# Patient Record
Sex: MALE | Race: WHITE | NOT HISPANIC OR LATINO | Employment: FULL TIME | URBAN - NONMETROPOLITAN AREA
[De-identification: names, ages, dates, MRNs, and addresses within clinical notes are randomized per-mention and may not be internally consistent; named-entity substitution may affect disease eponyms.]

---

## 2017-01-13 ENCOUNTER — OFFICE VISIT (OUTPATIENT)
Dept: INTERNAL MEDICINE | Facility: CLINIC | Age: 32
End: 2017-01-13

## 2017-01-13 VITALS
SYSTOLIC BLOOD PRESSURE: 102 MMHG | HEIGHT: 73 IN | TEMPERATURE: 98.2 F | OXYGEN SATURATION: 98 % | HEART RATE: 86 BPM | DIASTOLIC BLOOD PRESSURE: 62 MMHG | WEIGHT: 202.19 LBS | BODY MASS INDEX: 26.8 KG/M2 | RESPIRATION RATE: 14 BRPM

## 2017-01-13 DIAGNOSIS — K21.9 GASTROESOPHAGEAL REFLUX DISEASE WITHOUT ESOPHAGITIS: ICD-10-CM

## 2017-01-13 DIAGNOSIS — I10 ESSENTIAL HYPERTENSION: ICD-10-CM

## 2017-01-13 DIAGNOSIS — B18.2 CHRONIC HEPATITIS C WITHOUT HEPATIC COMA (HCC): ICD-10-CM

## 2017-01-13 DIAGNOSIS — J06.9 ACUTE URI: ICD-10-CM

## 2017-01-13 DIAGNOSIS — R05.9 COUGH: ICD-10-CM

## 2017-01-13 DIAGNOSIS — N50.82 PAIN IN SCROTUM: ICD-10-CM

## 2017-01-13 DIAGNOSIS — G40.909 SEIZURE DISORDER (HCC): ICD-10-CM

## 2017-01-13 DIAGNOSIS — F41.8 DEPRESSION WITH ANXIETY: Primary | ICD-10-CM

## 2017-01-13 LAB
EXPIRATION DATE: NORMAL
FLUAV AG NPH QL: NORMAL
FLUBV AG NPH QL: NORMAL
INTERNAL CONTROL: NORMAL
Lab: NORMAL

## 2017-01-13 PROCEDURE — 87804 INFLUENZA ASSAY W/OPTIC: CPT | Performed by: PHYSICIAN ASSISTANT

## 2017-01-13 PROCEDURE — 99204 OFFICE O/P NEW MOD 45 MIN: CPT | Performed by: PHYSICIAN ASSISTANT

## 2017-01-13 RX ORDER — PAROXETINE 10 MG/1
10 TABLET, FILM COATED ORAL EVERY MORNING
Qty: 10 TABLET | Refills: 0 | Status: SHIPPED | OUTPATIENT
Start: 2017-01-13 | End: 2017-01-23

## 2017-01-13 RX ORDER — CARBAMAZEPINE 200 MG/1
200 TABLET ORAL 2 TIMES DAILY
COMMUNITY
End: 2017-01-13 | Stop reason: SDUPTHER

## 2017-01-13 RX ORDER — FLUOXETINE HYDROCHLORIDE 20 MG/1
20 CAPSULE ORAL DAILY
Qty: 30 CAPSULE | Refills: 2 | Status: SHIPPED | OUTPATIENT
Start: 2017-01-13 | End: 2017-08-22 | Stop reason: SDUPTHER

## 2017-01-13 RX ORDER — RANITIDINE 150 MG/1
150 TABLET ORAL NIGHTLY
COMMUNITY
End: 2017-01-13 | Stop reason: SDUPTHER

## 2017-01-13 RX ORDER — LORATADINE 10 MG/1
10 TABLET ORAL DAILY
Qty: 30 TABLET | Refills: 2 | Status: SHIPPED | OUTPATIENT
Start: 2017-01-13 | End: 2017-04-10 | Stop reason: SDUPTHER

## 2017-01-13 RX ORDER — HYDROXYZINE HYDROCHLORIDE 25 MG/1
TABLET, FILM COATED ORAL
Qty: 60 TABLET | Refills: 2 | Status: SHIPPED | OUTPATIENT
Start: 2017-01-13 | End: 2017-09-12 | Stop reason: SDUPTHER

## 2017-01-13 RX ORDER — LISINOPRIL 10 MG/1
10 TABLET ORAL DAILY
Qty: 30 TABLET | Refills: 2 | Status: SHIPPED | OUTPATIENT
Start: 2017-01-13 | End: 2017-09-12 | Stop reason: SDUPTHER

## 2017-01-13 RX ORDER — LISINOPRIL 10 MG/1
10 TABLET ORAL DAILY
COMMUNITY
End: 2017-01-13 | Stop reason: SDUPTHER

## 2017-01-13 RX ORDER — PAROXETINE 30 MG/1
30 TABLET, FILM COATED ORAL EVERY MORNING
COMMUNITY
End: 2017-01-13 | Stop reason: SDUPTHER

## 2017-01-13 RX ORDER — CARBAMAZEPINE 200 MG/1
100 TABLET ORAL 2 TIMES DAILY
Qty: 30 TABLET | Refills: 2 | Status: SHIPPED | OUTPATIENT
Start: 2017-01-13 | End: 2017-02-12

## 2017-01-13 RX ORDER — RANITIDINE 150 MG/1
150 TABLET ORAL 2 TIMES DAILY PRN
Qty: 60 TABLET | Refills: 2 | Status: SHIPPED | OUTPATIENT
Start: 2017-01-13 | End: 2017-09-12

## 2017-01-13 RX ORDER — OMEPRAZOLE 10 MG/1
10 CAPSULE, DELAYED RELEASE ORAL DAILY
COMMUNITY
End: 2017-01-13

## 2017-01-13 RX ORDER — LORATADINE 10 MG/1
10 TABLET ORAL DAILY
COMMUNITY
End: 2017-01-13 | Stop reason: SDUPTHER

## 2017-01-13 NOTE — MR AVS SNAPSHOT
Rafael Todd   1/13/2017 4:45 PM   Office Visit    Provider:  VERO Bee   Department:  [facility] PRIMARY CARE   Dept Phone:  [phone]                Your Full Care Plan              Today's Medication Changes          These changes are accurate as of: 1/13/17  5:25 PM.  If you have any questions, ask your nurse or doctor.               New Medication(s)Ordered:     FLUoxetine 20 MG capsule   Commonly known as:  PROZAC   Take 1 capsule by mouth Daily.       hydrOXYzine 25 MG tablet   Commonly known as:  ATARAX   Take 1-2 tablets at bedtime as needed for insomnia.         Medication(s)that have changed:     carBAMazepine 200 MG tablet   Commonly known as:  EPITOL   Take 0.5 tablets by mouth 2 (Two) Times a Day for 30 days.   What changed:  how much to take       PARoxetine 10 MG tablet   Commonly known as:  PAXIL   Take 1 tablet by mouth Every Morning for 10 days.   What changed:    - medication strength  - how much to take       raNITIdine 150 MG tablet   Commonly known as:  ZANTAC   Take 1 tablet by mouth 2 (Two) Times a Day As Needed for heartburn or indigestion.   What changed:    - when to take this  - reasons to take this         Stop taking medication(s)listed here:     omeprazole 10 MG capsule   Commonly known as:  prilOSEC                Where to Get Your Medications      These medications were sent to [facility] - [address] [place] [address] AT [facility] - [phone]  - [phone] [address] [address]     Phone:  [phone]     carBAMazepine 200 MG tablet    FLUoxetine 20 MG capsule    hydrOXYzine 25 MG tablet    lisinopril 10 MG tablet    loratadine 10 MG tablet    PARoxetine 10 MG tablet    raNITIdine 150 MG tablet                  Your Updated Medication List          This list is accurate as of: 1/13/17  5:25 PM.  Always use your most recent med list.                carBAMazepine 200 MG tablet    Commonly known as:  EPITOL   Take 0.5 tablets by mouth 2 (Two) Times a Day for 30 days.       FLUoxetine 20 MG capsule   Commonly known as:  PROZAC   Take 1 capsule by mouth Daily.       hydrOXYzine 25 MG tablet   Commonly known as:  ATARAX   Take 1-2 tablets at bedtime as needed for insomnia.       lisinopril 10 MG tablet   Commonly known as:  PRINIVIL,ZESTRIL   Take 1 tablet by mouth Daily.       loratadine 10 MG tablet   Commonly known as:  CLARITIN   Take 1 tablet by mouth Daily.       PARoxetine 10 MG tablet   Commonly known as:  PAXIL   Take 1 tablet by mouth Every Morning for 10 days.       raNITIdine 150 MG tablet   Commonly known as:  ZANTAC   Take 1 tablet by mouth 2 (Two) Times a Day As Needed for heartburn or indigestion.               We Performed the Following     Ambulatory Referral to Infectious Disease     US Scrotum & Testicles       You Were Diagnosed With        Codes Comments    Depression with anxiety    -  Primary ICD-10-CM: F41.8  ICD-9-CM: 300.4     Seizure disorder     ICD-10-CM: G40.909  ICD-9-CM: 345.90     Chronic hepatitis C without hepatic coma     ICD-10-CM: B18.2  ICD-9-CM: 070.54     Essential hypertension     ICD-10-CM: I10  ICD-9-CM: 401.9     Gastroesophageal reflux disease without esophagitis     ICD-10-CM: K21.9  ICD-9-CM: 530.81     Pain in scrotum     ICD-10-CM: N50.82  ICD-9-CM: 608.9       Instructions     None    Patient Instructions History      Health Elements Signup     [facility] allows you to send messages to your doctor, view your test results, renew your prescriptions, schedule appointments, and more. To sign up, go to [employer] and click on the Sign Up Now link in the New User? box. Enter your [employer] Activation Code exactly as it appears below along with the last four digits of your Social Security Number and your Date of Birth () to complete the sign-up process. If you do not sign up before the expiration date, you must request a new  "code.    [employer] Activation Code: [email or web address]  Expires: 1/27/2017  5:25 PM    If you have questions, you can email [email or web address] or call [phone] to talk to our [employer] staff. Remember, [employer] is NOT to be used for urgent needs. For medical emergencies, dial 911.               Other Info from Your Visit           Allergies     Gabapentin        Reason for Visit     Establish Care needs new pcp, med refills and referrals to GI for Hep. C, urology and neurology.       Vital Signs     Blood Pressure Pulse Temperature Respirations Height Weight    102/62 86 98.2 °F (36.8 °C) 14 73\" (185.4 cm) 202 lb 3 oz (91.7 kg)    Oxygen Saturation Body Mass Index                98% 26.68 kg/m2          Problems and Diagnoses Noted     Chronic hepatitis C without hepatic coma    Depression with anxiety    High blood pressure    Acid reflux disease    Seizure disorder    Pain in scrotum          "

## 2017-01-13 NOTE — PROGRESS NOTES
Subjective   Rafael Todd is a 31 y.o. male who presents to establish care.    Chief Complaint:  Diagnosed with Hepatitis C this past summer and needs referral to hepatology or gastroenterology for treatment.     Seizure activity around 2012 while he was drinking heavily. Was started on Tegretol but is unsure if he really needs it and requesting referral to neurology for consultation. No seizure activity in many years.     Pain in the scrotum with intercourse for the last 6 months and worsening. Denies any testicular pain or lumps. No history of hernia. Trouble maintaining erection due to pain.     Anxiety with panic attacks, depression and insomnia. Using Paxil which seemed to help for awhile but has stopped despite increase from 20 to 30 mg daily.  Tried Celexa, Zoloft and Effexor without improvement in the past.     GERD: was put on Zantac 150 mg at bedtime as well as Prilosec 10 mg each morning. Feels the Zantac helps him more and does not want to be on 2 different medications.     Essential Hypertension: started on Lisinopril about 5 months ago. BP tends to run higher at home around 140s systolic.     24 hours of low grade fever, body aches, chills, malaise, sore throat, hoarseness and cough.       The following portions of the patient's history were reviewed and updated as appropriate: allergies, current medications, past family history, past medical history, past social history, past surgical history and problem list.    Review of Systems  Review of Systems   Constitutional: Positive for chills, fatigue and fever. Negative for activity change, appetite change and unexpected weight change.        No malaise   HENT: Positive for rhinorrhea, sore throat and voice change. Negative for ear pain, hearing loss, nosebleeds and trouble swallowing.    Eyes: Negative for pain, discharge, redness, itching and visual disturbance.        No dry eyes   Respiratory: Positive for cough. Negative for chest tightness, shortness  "of breath and wheezing.         No SOB with exertion  No SOB lying down   Cardiovascular: Negative for chest pain, palpitations and leg swelling.        No leg cramps   Gastrointestinal: Negative for abdominal pain, blood in stool, constipation, diarrhea, nausea and vomiting.        No frequent heartburn  No change in bowel habits   Endocrine: Negative for cold intolerance, heat intolerance, polydipsia, polyphagia and polyuria.        No Muscle weakness  No feeling of weakness  No Hot flashes   Genitourinary: Positive for testicular pain. Negative for decreased urine volume, difficulty urinating, discharge, dysuria, frequency, hematuria, penile pain and urgency.        No lump on testicles   Musculoskeletal: Positive for myalgias. Negative for arthralgias, gait problem and joint swelling.        No limb pain  No limb swelling   Skin: Negative for rash and wound.        No rash  No lesions  No Itching  No change in mole   Neurological: Negative for dizziness, tremors, seizures, syncope, weakness, numbness and headaches.        No trouble walking  No confusion  No tingling       Hematological: Negative for adenopathy. Does not bruise/bleed easily.   Psychiatric/Behavioral: Positive for dysphoric mood and sleep disturbance. Negative for suicidal ideas. The patient is nervous/anxious.         No change in personality         Objective    Visit Vitals   • /62   • Pulse 86   • Temp 98.2 °F (36.8 °C)   • Resp 14   • Ht 73\" (185.4 cm)   • Wt 202 lb 3 oz (91.7 kg)   • SpO2 98%   • BMI 26.68 kg/m2     Physical Exam   Constitutional: He is oriented to person, place, and time. He appears well-developed and well-nourished. No distress.   HENT:   Head: Normocephalic and atraumatic.   OP erythema   Eyes: EOM are normal. Pupils are equal, round, and reactive to light.   Neck: Normal range of motion. Neck supple.   Cardiovascular: Normal rate, regular rhythm and normal heart sounds.  Exam reveals no gallop and no friction rub. "    No murmur heard.  Pulmonary/Chest: Effort normal and breath sounds normal. No respiratory distress. He exhibits no tenderness.   Musculoskeletal: Normal range of motion.   Neurological: He is alert and oriented to person, place, and time. No cranial nerve deficit. He exhibits normal muscle tone.   Skin: Skin is warm and dry. He is not diaphoretic.   Psychiatric: He has a normal mood and affect. His behavior is normal. Judgment and thought content normal.   Nursing note and vitals reviewed.        Assessment/Plan      Diagnosis Plan   1. Depression with anxiety  FLUoxetine (PROZAC) 20 MG capsule    PARoxetine (PAXIL) 10 MG tablet    hydrOXYzine (ATARAX) 25 MG tablet prn for insomnia or anxiety.    2. Seizure disorder  carBAMazepine (EPITOL) 200 MG tablet, decrease dose to 1/2 tab bid. If any seizure activity- increase back to original dose.   Referral to neurology.    3. Chronic hepatitis C without hepatic coma  Ambulatory Referral to Infectious Disease   4. Essential hypertension  lisinopril (PRINIVIL,ZESTRIL) 10 MG tablet   5. Gastroesophageal reflux disease without esophagitis  raNITIdine (ZANTAC) 150 MG tablet- increase to bid. D/c Omeprazole.    Acute URI- negative influenza A&B    DC Paxil- taper over the next 10 days prior to starting Prozac. F/u 1 month.

## 2017-01-19 ENCOUNTER — APPOINTMENT (OUTPATIENT)
Dept: ULTRASOUND IMAGING | Facility: HOSPITAL | Age: 32
End: 2017-01-19

## 2017-01-20 ENCOUNTER — HOSPITAL ENCOUNTER (OUTPATIENT)
Dept: ULTRASOUND IMAGING | Facility: HOSPITAL | Age: 32
Discharge: HOME | End: 2017-01-20
Admitting: PHYSICIAN ASSISTANT

## 2017-01-20 PROCEDURE — 76870 US EXAM SCROTUM: CPT

## 2017-04-10 RX ORDER — LORATADINE 10 MG/1
10 TABLET ORAL DAILY
Qty: 30 TABLET | Refills: 2 | Status: SHIPPED | OUTPATIENT
Start: 2017-04-10 | End: 2017-09-12

## 2017-08-20 ENCOUNTER — HOSPITAL ENCOUNTER (EMERGENCY)
Facility: HOSPITAL | Age: 32
Discharge: HOME | End: 2017-08-21
Attending: EMERGENCY MEDICINE | Admitting: EMERGENCY MEDICINE

## 2017-08-20 DIAGNOSIS — R07.89 ATYPICAL CHEST PAIN: Primary | ICD-10-CM

## 2017-08-20 PROCEDURE — 93005 ELECTROCARDIOGRAM TRACING: CPT | Performed by: EMERGENCY MEDICINE

## 2017-08-20 PROCEDURE — 99284 EMERGENCY DEPT VISIT MOD MDM: CPT

## 2017-08-20 RX ORDER — OMEPRAZOLE 20 MG/1
20 CAPSULE, DELAYED RELEASE ORAL DAILY
COMMUNITY
End: 2017-09-12

## 2017-08-20 RX ORDER — FLUOXETINE HYDROCHLORIDE 20 MG/1
40 CAPSULE ORAL ONCE
Status: COMPLETED | OUTPATIENT
Start: 2017-08-21 | End: 2017-08-21

## 2017-08-20 RX ORDER — CARBAMAZEPINE 100 MG/1
100 TABLET, EXTENDED RELEASE ORAL 2 TIMES DAILY
COMMUNITY
End: 2017-09-12

## 2017-08-21 VITALS
SYSTOLIC BLOOD PRESSURE: 107 MMHG | BODY MASS INDEX: 26.51 KG/M2 | HEIGHT: 73 IN | WEIGHT: 200 LBS | HEART RATE: 73 BPM | DIASTOLIC BLOOD PRESSURE: 74 MMHG | TEMPERATURE: 98.3 F | RESPIRATION RATE: 16 BRPM | OXYGEN SATURATION: 96 %

## 2017-08-21 LAB — TROPONIN I SERPL-MCNC: <0.012 NG/ML (ref 0–0.03)

## 2017-08-21 PROCEDURE — 84484 ASSAY OF TROPONIN QUANT: CPT | Performed by: EMERGENCY MEDICINE

## 2017-08-21 RX ADMIN — FLUOXETINE 40 MG: 20 CAPSULE ORAL at 00:08

## 2017-08-21 NOTE — ED PROVIDER NOTES
Subjective   History of Present Illness   32M w/ hx of depression / anxiety p/w numbness L face, arm, chest, SOB, tremors, mild central chest pain that started suddenly after turning TV off about 30 minutes pta. Ran out of prozac 2d ago and has missed last night and today's doses. Denies any history of prior DVT or PE, recent surgery or trauma to legs, hemoptysis, leg swelling or hormone use.  Denies other complaints.     Review of Systems   Cardiovascular: Positive for chest pain.   Neurological: Positive for numbness.   All other systems reviewed and are negative.      Past Medical History:   Diagnosis Date   • Acid reflux    • Anxiety    • Depression    • Hepatitis C    • Hypertension    • Insomnia    • Seizure disorder        Allergies   Allergen Reactions   • Gabapentin    • Keflex [Cephalexin] Nausea Only       History reviewed. No pertinent surgical history.    Family History   Problem Relation Age of Onset   • Arthritis Mother    • Mental illness Mother    • Arthritis Father    • Diabetes Father    • Hypertension Father    • Mental illness Father    • Cancer Neg Hx        Social History     Social History   • Marital status:      Spouse name: N/A   • Number of children: N/A   • Years of education: N/A     Social History Main Topics   • Smoking status: Current Every Day Smoker   • Smokeless tobacco: None   • Alcohol use No   • Drug use: None   • Sexual activity: Not Asked     Other Topics Concern   • None     Social History Narrative           Objective   Physical Exam   Constitutional: He is oriented to person, place, and time. He appears well-developed and well-nourished. No distress.   HENT:   Head: Normocephalic.   Mouth/Throat: Oropharynx is clear and moist.   Eyes: No scleral icterus.   Neck: Normal range of motion. Neck supple. No tracheal deviation present.   Cardiovascular: Normal rate, regular rhythm, normal heart sounds and intact distal pulses.  Exam reveals no gallop and no friction rub.     No murmur heard.  Pulmonary/Chest: Effort normal and breath sounds normal. No stridor. No respiratory distress. He has no wheezes. He has no rales. He exhibits no tenderness.   Abdominal: Soft. He exhibits no distension and no mass. There is no tenderness. There is no rebound and no guarding.   Musculoskeletal: Normal range of motion. He exhibits no deformity.   Neurological: He is alert and oriented to person, place, and time.   Skin: Skin is warm and dry. No rash noted. He is not diaphoretic. No erythema. No pallor.   Psychiatric: He has a normal mood and affect. His behavior is normal.   Nursing note and vitals reviewed.      Procedures         ED Course  ED Course                  MDM   32M here w/ atypical CP, numbness, tremors, SOB. AFVSS. Exam unremarkable. HEART =1 (for smoking). Nothing to suggest PE, dissection, tamponade, PNA, PNTX. Will get EKG, troponin and give dose of prozac here. More likely panic attack given hx of similar symptoms in past. If reassuring w/u, will d/c home. Pt is calling for refill of prozac tomorrow.     EKG: NSR w/ nl intervals, no jaymie/std. Overall, reassuring EKG    1:03 AM Troponin unremarkable. Discussed return to care precautions.     Final diagnoses:   Atypical chest pain            Pako Mann MD  08/21/17 0103

## 2017-08-22 DIAGNOSIS — F41.8 DEPRESSION WITH ANXIETY: ICD-10-CM

## 2017-08-22 RX ORDER — FLUOXETINE HYDROCHLORIDE 20 MG/1
20 CAPSULE ORAL DAILY
Qty: 30 CAPSULE | Refills: 0 | Status: SHIPPED | OUTPATIENT
Start: 2017-08-22 | End: 2017-09-12

## 2017-08-22 NOTE — TELEPHONE ENCOUNTER
Patient is completely out of Prozac, went to ER after not taking it at all Sunday, due to a panic attack and feeling funny. Does not want to go without it another day. Scheduled to see Emmie 8/25, can enough be called in to get him through to appt?

## 2017-08-24 ENCOUNTER — TELEPHONE (OUTPATIENT)
Dept: SURGERY | Facility: CLINIC | Age: 32
End: 2017-08-24

## 2017-08-24 ENCOUNTER — OFFICE VISIT (OUTPATIENT)
Dept: RETAIL CLINIC | Facility: CLINIC | Age: 32
End: 2017-08-24

## 2017-08-24 DIAGNOSIS — Z02.1 PRE-EMPLOYMENT DRUG SCREENING: Primary | ICD-10-CM

## 2017-08-24 NOTE — TELEPHONE ENCOUNTER
Patient no showed for appointment with Dr Cabrera unable to reach patient by phone, no show letter mailed to  Patient.Called PCP office spoke with Mehnaz  told her patient no showed for appointment . Mehnaz ask me to fax note saying patient no showed for appointment and so I did.

## 2017-09-12 ENCOUNTER — OFFICE VISIT (OUTPATIENT)
Dept: INTERNAL MEDICINE | Facility: CLINIC | Age: 32
End: 2017-09-12

## 2017-09-12 VITALS
TEMPERATURE: 98.2 F | WEIGHT: 189 LBS | DIASTOLIC BLOOD PRESSURE: 62 MMHG | OXYGEN SATURATION: 99 % | HEIGHT: 73 IN | HEART RATE: 93 BPM | SYSTOLIC BLOOD PRESSURE: 110 MMHG | BODY MASS INDEX: 25.05 KG/M2

## 2017-09-12 DIAGNOSIS — B18.2 CHRONIC HEPATITIS C WITHOUT HEPATIC COMA (HCC): Primary | ICD-10-CM

## 2017-09-12 DIAGNOSIS — G40.909 SEIZURE DISORDER (HCC): ICD-10-CM

## 2017-09-12 DIAGNOSIS — I10 ESSENTIAL HYPERTENSION: ICD-10-CM

## 2017-09-12 DIAGNOSIS — F41.8 DEPRESSION WITH ANXIETY: ICD-10-CM

## 2017-09-12 DIAGNOSIS — K21.9 GASTROESOPHAGEAL REFLUX DISEASE WITHOUT ESOPHAGITIS: ICD-10-CM

## 2017-09-12 DIAGNOSIS — J06.9 ACUTE URI: ICD-10-CM

## 2017-09-12 LAB
EXPIRATION DATE: NORMAL
FLUAV AG NPH QL: NEGATIVE
FLUBV AG NPH QL: NEGATIVE
INTERNAL CONTROL: NORMAL
Lab: NORMAL

## 2017-09-12 PROCEDURE — 87804 INFLUENZA ASSAY W/OPTIC: CPT | Performed by: PHYSICIAN ASSISTANT

## 2017-09-12 PROCEDURE — 99214 OFFICE O/P EST MOD 30 MIN: CPT | Performed by: PHYSICIAN ASSISTANT

## 2017-09-12 RX ORDER — CARBAMAZEPINE 200 MG/1
200 TABLET ORAL 2 TIMES DAILY
COMMUNITY
End: 2017-09-12 | Stop reason: SDUPTHER

## 2017-09-12 RX ORDER — OMEPRAZOLE 40 MG/1
40 CAPSULE, DELAYED RELEASE ORAL DAILY
COMMUNITY
End: 2017-09-12 | Stop reason: SDUPTHER

## 2017-09-12 RX ORDER — AZITHROMYCIN 250 MG/1
TABLET, FILM COATED ORAL
Qty: 6 TABLET | Refills: 0 | Status: SHIPPED | OUTPATIENT
Start: 2017-09-12 | End: 2017-10-23

## 2017-09-12 RX ORDER — OMEPRAZOLE 40 MG/1
40 CAPSULE, DELAYED RELEASE ORAL DAILY
Qty: 30 CAPSULE | Refills: 5 | Status: SHIPPED | OUTPATIENT
Start: 2017-09-12 | End: 2018-02-15 | Stop reason: SDUPTHER

## 2017-09-12 RX ORDER — BUPRENORPHINE HYDROCHLORIDE AND NALOXONE HYDROCHLORIDE 5.7; 1.4 MG/1; MG/1
TABLET, ORALLY DISINTEGRATING SUBLINGUAL
Refills: 0 | COMMUNITY
Start: 2017-08-30 | End: 2017-10-23 | Stop reason: SDDI

## 2017-09-12 RX ORDER — HYDROXYZINE HYDROCHLORIDE 25 MG/1
TABLET, FILM COATED ORAL
Qty: 60 TABLET | Refills: 5 | Status: SHIPPED | OUTPATIENT
Start: 2017-09-12 | End: 2018-03-12

## 2017-09-12 RX ORDER — ONDANSETRON 8 MG/1
8 TABLET, ORALLY DISINTEGRATING ORAL EVERY 8 HOURS PRN
Qty: 12 TABLET | Refills: 0 | Status: SHIPPED | OUTPATIENT
Start: 2017-09-12 | End: 2018-03-12

## 2017-09-12 RX ORDER — CETIRIZINE HYDROCHLORIDE 10 MG/1
10 TABLET ORAL NIGHTLY
Qty: 30 TABLET | Refills: 5 | Status: SHIPPED | OUTPATIENT
Start: 2017-09-12 | End: 2018-03-12

## 2017-09-12 RX ORDER — FLUOXETINE HYDROCHLORIDE 40 MG/1
40 CAPSULE ORAL DAILY
Qty: 30 CAPSULE | Refills: 5 | Status: SHIPPED | OUTPATIENT
Start: 2017-09-12 | End: 2018-02-15 | Stop reason: SDUPTHER

## 2017-09-12 RX ORDER — CARBAMAZEPINE 200 MG/1
200 TABLET ORAL 2 TIMES DAILY
Qty: 60 TABLET | Refills: 5 | Status: SHIPPED | OUTPATIENT
Start: 2017-09-12 | End: 2018-02-15 | Stop reason: SDUPTHER

## 2017-09-12 RX ORDER — LISINOPRIL 5 MG/1
5 TABLET ORAL DAILY
Qty: 30 TABLET | Refills: 5 | Status: SHIPPED | OUTPATIENT
Start: 2017-09-12 | End: 2018-02-15 | Stop reason: SDUPTHER

## 2017-09-12 NOTE — PROGRESS NOTES
Rafael Todd is a 32 y.o. male.     Subjective   History of Present Illness   Here today for follow up of ER visit on 8/20 when he was diagnosed with atypical chest pain.  Had been out of Prozac for 3 days and anxiety was very bad at the time. No recent chest pain since ER visit.     Prozac dose was increased to 40 mg by an outside provider and he feels this has helped his depression and anxiety considerably.     Has also had 3 days of cough, fatigue, head congestion and body aches along with diarrhea. Denies fever but has had chills. Symptoms steadily worsening since onset.     Would like to be tested for H. Pylori since his wife was recently treated for it. He reports chronic acid reflux and epigastric pain.         The following portions of the patient's history were reviewed and updated as appropriate: allergies, current medications, past family history, past medical history, past social history, past surgical history and problem list.    Review of Systems    Constitutional: fatigue, chills. Negative for appetite change, chills, fever and unexpected weight change.   HENT: congestion, postnasal drip, rhinorrhea, sore throat.  Negative for ear pain, hearing loss, nosebleeds, tinnitus and trouble swallowing.    Eyes: Negative for photophobia, discharge and visual disturbance.   Respiratory: cough. Negative for chest tightness, shortness of breath and wheezing.    Cardiovascular: chest pain. Negative for palpitations and leg swelling.   Gastrointestinal: acid reflux, epigastric pain. Negative for abdominal distention, blood in stool, constipation, diarrhea, nausea and vomiting.   Endocrine: Negative for cold intolerance, heat intolerance, polydipsia, polyphagia and polyuria.   Musculoskeletal: body aches. Negative for back pain, joint swelling, myalgias, neck pain and neck stiffness.   Skin: Negative for color change, pallor, rash and wound.   Allergic/Immunologic: Negative for environmental allergies, food  "allergies and immunocompromised state.   Neurological: Negative for dizziness, tremors, seizures, weakness, numbness and headaches.   Hematological: Negative for adenopathy. Does not bruise/bleed easily.   Psychiatric/Behavioral: anxious, dysphoric mood. Negative for sleep disturbances, agitation, behavioral problems, confusion, hallucinations, self-injury and suicidal ideas.     Objective    Physical Exam  Constitutional: Oriented to person, place, and time. Appears well-developed and well-nourished.   HENT: OP erythema, visible PND.   Head: Normocephalic and atraumatic.   Eyes: EOM are normal. Pupils are equal, round, and reactive to light.   Neck: Normal range of motion. Neck supple.   Cardiovascular: Normal rate, regular rhythm and normal heart sounds.    Pulmonary/Chest: Effort normal and breath sounds normal. No respiratory distress.  Has no wheezes or rales. Exhibits no chest wall tenderness.   Abdominal: Soft. Bowel sounds are normal. Exhibits no distension and no mass. There is no tenderness.   Musculoskeletal: Normal range of motion. Exhibits no tenderness.   Neurological: Alert and oriented to person, place, and time.   Skin: Skin is warm and dry.   Psychiatric: Has a normal mood and affect. Behavior is normal. Judgment and thought content normal.         /62  Pulse 93  Temp 98.2 °F (36.8 °C)  Ht 73\" (185.4 cm)  Wt 189 lb (85.7 kg)  SpO2 99%  BMI 24.94 kg/m2    Nursing note and vitals reviewed.        Assessment/Plan   Rafael was seen today for follow-up.    Diagnoses and all orders for this visit:    Chronic hepatitis C without hepatic coma  -     Ambulatory Referral to Gastroenterology  -     Comprehensive Metabolic Panel    Essential hypertension  -     lisinopril (PRINIVIL,ZESTRIL) 5 MG tablet; Take 1 tablet by mouth Daily.  -     Comprehensive Metabolic Panel    Depression with anxiety  -     hydrOXYzine (ATARAX) 25 MG tablet; Take 1-2 tablets at bedtime as needed for insomnia.  -     " FLUoxetine (PROzac) 40 MG capsule; Take 1 capsule by mouth Daily.    Gastroesophageal reflux disease without esophagitis  -     omeprazole (priLOSEC) 40 MG capsule; Take 1 capsule by mouth Daily.  -     Helicobacter Pylori, IgA IgG IgM    Seizure disorder  -     carBAMazepine (TEGretol) 200 MG tablet; Take 1 tablet by mouth 2 (Two) Times a Day.  -     Ambulatory Referral to Neurology  -     Comprehensive Metabolic Panel    Acute URI  -     azithromycin (ZITHROMAX Z-AYSE) 250 MG tablet; Take 2 tablets the first day, then 1 tablet daily for 4 days.    Other orders  -     cetirizine (zyrTEC) 10 MG tablet; Take 1 tablet by mouth Every Night.    ER record reviewed.     Flu negative.

## 2017-09-13 LAB
ALBUMIN SERPL-MCNC: 4.8 G/DL (ref 3.5–5)
ALBUMIN/GLOB SERPL: 1.4 G/DL (ref 1–2)
ALP SERPL-CCNC: 78 U/L (ref 38–126)
ALT SERPL-CCNC: 62 U/L (ref 13–69)
AST SERPL-CCNC: 41 U/L (ref 15–46)
BILIRUB SERPL-MCNC: 0.6 MG/DL (ref 0.2–1.3)
BUN SERPL-MCNC: 10 MG/DL (ref 7–20)
BUN/CREAT SERPL: 10 (ref 6.3–21.9)
CALCIUM SERPL-MCNC: 10.2 MG/DL (ref 8.4–10.2)
CHLORIDE SERPL-SCNC: 104 MMOL/L (ref 98–107)
CO2 SERPL-SCNC: 26 MMOL/L (ref 26–30)
CREAT SERPL-MCNC: 1 MG/DL (ref 0.6–1.3)
GLOBULIN SER CALC-MCNC: 3.5 GM/DL
GLUCOSE SERPL-MCNC: 79 MG/DL (ref 74–98)
H PYLORI IGA SER-ACNC: <9 UNITS (ref 0–8.9)
H PYLORI IGG SER IA-ACNC: <0.9 U/ML (ref 0–0.8)
H PYLORI IGM SER-ACNC: <9 UNITS (ref 0–8.9)
POTASSIUM SERPL-SCNC: 5.1 MMOL/L (ref 3.5–5.1)
PROT SERPL-MCNC: 8.3 G/DL (ref 6.3–8.2)
SODIUM SERPL-SCNC: 143 MMOL/L (ref 137–145)

## 2017-09-14 DIAGNOSIS — K21.9 GASTROESOPHAGEAL REFLUX DISEASE, ESOPHAGITIS PRESENCE NOT SPECIFIED: ICD-10-CM

## 2017-09-14 DIAGNOSIS — R10.13 EPIGASTRIC PAIN: Primary | ICD-10-CM

## 2017-10-23 ENCOUNTER — PREP FOR SURGERY (OUTPATIENT)
Dept: OTHER | Facility: HOSPITAL | Age: 32
End: 2017-10-23

## 2017-10-23 ENCOUNTER — PROCEDURE VISIT (OUTPATIENT)
Dept: GASTROENTEROLOGY | Facility: CLINIC | Age: 32
End: 2017-10-23

## 2017-10-23 VITALS
RESPIRATION RATE: 16 BRPM | HEART RATE: 86 BPM | TEMPERATURE: 96 F | HEIGHT: 73 IN | BODY MASS INDEX: 27.17 KG/M2 | DIASTOLIC BLOOD PRESSURE: 90 MMHG | SYSTOLIC BLOOD PRESSURE: 138 MMHG | WEIGHT: 205 LBS

## 2017-10-23 DIAGNOSIS — R12 HEARTBURN: Chronic | ICD-10-CM

## 2017-10-23 DIAGNOSIS — R10.13 EPIGASTRIC PAIN: Primary | Chronic | ICD-10-CM

## 2017-10-23 DIAGNOSIS — R11.0 NAUSEA: Chronic | ICD-10-CM

## 2017-10-23 DIAGNOSIS — B19.20 HEPATITIS C VIRUS INFECTION WITHOUT HEPATIC COMA, UNSPECIFIED CHRONICITY: ICD-10-CM

## 2017-10-23 DIAGNOSIS — R12 HEARTBURN: ICD-10-CM

## 2017-10-23 DIAGNOSIS — R11.0 NAUSEA: ICD-10-CM

## 2017-10-23 DIAGNOSIS — R10.13 EPIGASTRIC PAIN: Primary | ICD-10-CM

## 2017-10-23 PROCEDURE — 99214 OFFICE O/P EST MOD 30 MIN: CPT | Performed by: NURSE PRACTITIONER

## 2017-10-23 RX ORDER — SODIUM CHLORIDE 9 MG/ML
70 INJECTION, SOLUTION INTRAVENOUS CONTINUOUS PRN
Status: CANCELLED | OUTPATIENT
Start: 2017-10-23

## 2017-10-23 NOTE — PROGRESS NOTES
"Chief Complaint   Patient presents with   • Abdominal Pain   • Nausea   • Heartburn   • Liver Eval     The patient has a history of epigastric pain for the past 6 months. The patient has the pain daily usually. The pain is described as severe. Eating does not affect the pain. The patient has been taking Omeprazole without improvement of the pain. The patient has a long-standing history of hiccups. He may have hiccups for up to 9 days without stopping. He has wanted to go to the ER in the past as it did not give him a \"chance to breathe\" between the hiccups. Of interest, the patient drinks 4-5 pots of coffee daily.    There is a long-standing history of heartburn. The patient may have heartburn intermittently. Heartburn is described as mild. It does not wake him at night, unless he has the hiccups and this can cause regurgitative symptoms due to hiccups.     The patient has a history of nausea since April 2017. The patient has nausea when he has been having the hiccups. Eating does not affect the nausea. The patient has taken Zofran with good improvement.    The patient was diagnosed with hepatitis C in 2016 by a prior PCP in [place], [place]. No history of work up or treatment in the past. There is a history of IVDA  From teenage years until October 2016. There is a history of tattoos. No history of blood transfusions. There is a history of alcohol use. The patient started drinking alcohol as a teenager. He decreased alcohol intake in December 2016. He had 2 beers last night, 10/22/2017, and this was his first alcohol since December 2016. The patient has a history of depression and is currently being treated. The patient does not feel like it is helping at this time. No history of suicidal thoughts or suicidal attempts in the past. The patient is , the wife has 2 children, but they do not live with them. The patient is not working at this time, he is unemployed.    There is no history of constipation or diarrhea. " There is no history of bright red blood per rectum. The patient has not had a colonoscopy in the past. There is no family history of colon cancer.    Abdominal Pain   This is a chronic problem. Episode onset: April 2017. The onset quality is sudden. The problem occurs daily. The problem has been unchanged. The pain is located in the epigastric region. The pain is severe. The quality of the pain is sharp. The abdominal pain does not radiate. Associated symptoms include nausea. Pertinent negatives include no arthralgias, constipation, diarrhea, dysuria, fever, headaches, hematochezia, hematuria, melena, myalgias or vomiting. Nothing aggravates the pain. The pain is relieved by nothing. He has tried proton pump inhibitors for the symptoms. The treatment provided no relief. His past medical history is significant for GERD.   Nausea   This is a chronic problem. Episode onset: April 2017. The problem occurs intermittently. The problem has been unchanged. Associated symptoms include abdominal pain, chest pain, congestion, coughing, fatigue and nausea. Pertinent negatives include no arthralgias, chills, fever, headaches, joint swelling, myalgias, rash or vomiting. Nothing aggravates the symptoms. Treatments tried: Zofran. The treatment provided significant relief.   Heartburn   He complains of abdominal pain, chest pain, coughing, heartburn and nausea. He reports no choking or no dysphagia. This is a chronic problem. Episode onset: over 10 years. The problem occurs occasionally. The problem has been unchanged. The heartburn duration is an hour. The heartburn is of mild intensity. Nothing aggravates the symptoms. Associated symptoms include fatigue. Pertinent negatives include no melena. Risk factors include caffeine use and smoking/tobacco exposure (The patient drinks 5-6 pots of coffee daily). He has tried a PPI for the symptoms. The treatment provided significant relief.      Review of Systems   Constitutional: Positive  for appetite change and fatigue. Negative for chills, fever and unexpected weight change.   HENT: Positive for congestion. Negative for mouth sores, nosebleeds and trouble swallowing.    Eyes: Negative for discharge and redness.   Respiratory: Positive for cough. Negative for apnea, choking and shortness of breath.    Cardiovascular: Positive for chest pain. Negative for palpitations and leg swelling.   Gastrointestinal: Positive for abdominal pain, heartburn and nausea. Negative for abdominal distention, anal bleeding, blood in stool, constipation, diarrhea, dysphagia, hematochezia, melena and vomiting.   Endocrine: Negative for cold intolerance, heat intolerance and polydipsia.   Genitourinary: Negative for dysuria, hematuria and urgency.   Musculoskeletal: Negative for arthralgias, joint swelling and myalgias.   Skin: Negative for rash.   Allergic/Immunologic: Negative for food allergies and immunocompromised state.   Neurological: Negative for dizziness, seizures, syncope and headaches.   Hematological: Negative for adenopathy. Does not bruise/bleed easily.   Psychiatric/Behavioral: Negative for dysphoric mood. The patient is not nervous/anxious and is not hyperactive.      Patient Active Problem List   Diagnosis   • Chronic hepatitis C without hepatic coma   • Depression with anxiety   • Seizure disorder   • Essential hypertension   • Gastroesophageal reflux disease without esophagitis   • Epigastric pain   • Heartburn   • Nausea   • Hepatitis C virus infection without hepatic coma     Past Medical History:   Diagnosis Date   • Acid reflux    • Anxiety    • Depression    • Hepatitis C    • Hypertension    • Insomnia    • Seizure disorder      History reviewed. No pertinent surgical history.     Family History   Problem Relation Age of Onset   • Arthritis Mother    • Mental illness Mother    • Inflammatory bowel disease Mother    • Arthritis Father    • Diabetes Father    • Hypertension Father    • Mental  "illness Father    • Cancer Neg Hx    • Colon cancer Neg Hx      Social History   Substance Use Topics   • Smoking status: Current Every Day Smoker     Packs/day: 1.00     Types: Cigarettes   • Smokeless tobacco: Never Used   • Alcohol use Yes      Comment: social       Current Outpatient Prescriptions:   •  carBAMazepine (TEGretol) 200 MG tablet, Take 1 tablet by mouth 2 (Two) Times a Day., Disp: 60 tablet, Rfl: 5  •  cetirizine (zyrTEC) 10 MG tablet, Take 1 tablet by mouth Every Night., Disp: 30 tablet, Rfl: 5  •  FLUoxetine (PROzac) 40 MG capsule, Take 1 capsule by mouth Daily., Disp: 30 capsule, Rfl: 5  •  hydrOXYzine (ATARAX) 25 MG tablet, Take 1-2 tablets at bedtime as needed for insomnia., Disp: 60 tablet, Rfl: 5  •  lisinopril (PRINIVIL,ZESTRIL) 5 MG tablet, Take 1 tablet by mouth Daily., Disp: 30 tablet, Rfl: 5  •  omeprazole (priLOSEC) 40 MG capsule, Take 1 capsule by mouth Daily., Disp: 30 capsule, Rfl: 5  •  ondansetron ODT (ZOFRAN ODT) 8 MG disintegrating tablet, Take 1 tablet by mouth Every 8 (Eight) Hours As Needed for Nausea or Vomiting., Disp: 12 tablet, Rfl: 0    Allergies   Allergen Reactions   • Gabapentin    • Keflex [Cephalexin] Nausea Only     /90  Pulse 86  Temp 96 °F (35.6 °C)  Resp 16  Ht 73\" (185.4 cm)  Wt 205 lb (93 kg)  BMI 27.05 kg/m2    Physical Exam   Constitutional: He is oriented to person, place, and time. He appears well-developed and well-nourished. No distress.   HENT:   Head: Normocephalic and atraumatic.   Right Ear: Hearing and external ear normal.   Left Ear: Hearing and external ear normal.   Nose: Nose normal.   Mouth/Throat: Oropharynx is clear and moist and mucous membranes are normal. Mucous membranes are not pale, not dry and not cyanotic. No oral lesions. No oropharyngeal exudate.   Eyes: Conjunctivae and EOM are normal. Right eye exhibits no discharge. Left eye exhibits no discharge.   Neck: Trachea normal. Neck supple. No JVD present. No edema present. No " thyroid mass and no thyromegaly present.   Cardiovascular: Normal rate, regular rhythm, S2 normal and normal heart sounds.  Exam reveals no gallop, no S3 and no friction rub.    No murmur heard.  Pulmonary/Chest: Effort normal and breath sounds normal. No respiratory distress. He exhibits no tenderness.   Abdominal: Normal appearance and bowel sounds are normal. He exhibits no distension, no ascites and no mass. There is no splenomegaly or hepatomegaly. There is no tenderness. There is no rigidity, no rebound and no guarding. No hernia.       Vascular Status -  His exam exhibits no right foot edema. His exam exhibits no left foot edema.  Lymphadenopathy:     He has no cervical adenopathy.        Left: No supraclavicular adenopathy present.   Neurological: He is alert and oriented to person, place, and time. He has normal strength. No cranial nerve deficit or sensory deficit.   Skin: No rash noted. He is not diaphoretic. No cyanosis. No pallor. Nails show no clubbing.   Psychiatric: He has a normal mood and affect.   Nursing note and vitals reviewed.  Stigmata of chronic liver disease:  None.  Asterixis:  None.    Laboratory Results:  Upon review of records:    Dated 9/12/2017 glucose 79 BUN 10 creatinine 1.0 sodium 143 potassium 5.1 chloride 104 CO2 26 calcium 10.2 albumin 4.8 total bilirubin 0.6 alkaline phosphatase 78 AST 41 ALT 62 H. pylori IgG: <0.9, H. pylori IgA: <9.0, H. pylori IgM: <9.0    Notes:  1. Diagnosed with hepatitis C in 2016 by a prior PCP in [place], [place]. No history of work up or treatment in the past.  2. There is a history of IVDA  From teenage years until October 2016.  3. There is a history of tattoos.  4. No history of blood transfusions.  5. There is a history of alcohol use. The patient started drinking alcohol as a teenager. He decreased alcohol intake in December 2016. He had 2 beers last night, 10/22/2017, and this was his first alcohol since December 2016.   6. The patient has a history  of depression and is currently being treated. The patient does not feel like it is helping at this time. No history of suicidal thoughts or suicidal attempts in the past.  7. The patient is , the wife has 2 children, but they do not live with them. The patient is not working at this time, he is unemployed.    Assessment and Plan:    Rafael was seen today for abdominal pain, nausea, heartburn and liver eval.    Diagnoses and all orders for this visit:    Epigastric pain  Comments:  Differentials include peptic ulcer disease, pancreatobiliary disease.  Orders:  -     CBC & Differential; Future  -     Comprehensive Metabolic Panel; Future  -     TSH; Future    Heartburn  Comments:  History of long-standing heartburn.  Controlled with omeprazole daily.  Concerns for underlying Ellis's.    Nausea  Comments:  Differentials include peptic ulcer disease, pancreatobiliary disease.  Orders:  -     CBC & Differential; Future  -     Comprehensive Metabolic Panel; Future  -     TSH; Future    Hepatitis C virus infection without hepatic coma, unspecified chronicity  Comments:  History of hepatitis C per patient.  Unfortunately, we do not have any labs regarding hepatitis C.  Orders:  -     CBC & Differential; Future  -     Comprehensive Metabolic Panel; Future  -     Hepatitis A Antibody, IgM; Future  -     Hepatitis A Antibody, Total; Future  -     Hepatitis B Core Antibody, IgM; Future  -     Hepatitis B Surface Antibody; Future  -     Hepatitis B Surface Antigen; Future  -     Hepatitis B Core Antibody, Total; Future  -     Hepatitis C RNA, Quantitative, PCR (graph); Future  -     Hepatitis C Genotype; Future        Plan  and Patient Instructions:  Patient Instructions   1. Antireflux measures: Avoid fried, fatty foods, alcohol, chocolate, coffee, tea,  soft drinks, peppermint and spearmint, spicy foods, tomatoes and tomato based foods, onion based foods, and smoking. Other antireflux measures include weight reduction  if overweight, avoiding tight clothing around the abdomen, elevating the head of the bed 6 inches with blocks under the head board, and don't drink or eat before going to bed and avoid lying down immediately after meals. Advised to decrease coffee intake.  2. Omeprazole 40 mg 1 po daily in the am 30 minutes before breakfast.  3. Zofran 4 mg 1 po every 8 hours as needed for nausea.  4. Avoid alcohol.  5. Avoid drugs.  6. Tylenol warning.  7. Hepatitis C counseling.  8. Check CBC, CMP, Hepatitis panel, TSH, HCV RNA by PCR with reflex to genotype.  9. Possible non-invasive liver work up in the future.  10. Possible abdominal ultrasound in the future.  11. Upper endoscopy-EGD: Description of the procedure, risks, benefits, alternatives and options, including nonoperative options, were discussed with the patient in detail. The patient understands and wishes to proceed.    The patient was seen along with his wife.    PANFILO Reyes

## 2017-10-23 NOTE — PATIENT INSTRUCTIONS
1. Antireflux measures: Avoid fried, fatty foods, alcohol, chocolate, coffee, tea,  soft drinks, peppermint and spearmint, spicy foods, tomatoes and tomato based foods, onion based foods, and smoking. Other antireflux measures include weight reduction if overweight, avoiding tight clothing around the abdomen, elevating the head of the bed 6 inches with blocks under the head board, and don't drink or eat before going to bed and avoid lying down immediately after meals. Advised to decrease coffee intake.  2. Omeprazole 40 mg 1 po daily in the am 30 minutes before breakfast.  3. Zofran 4 mg 1 po every 8 hours as needed for nausea.  4. Avoid alcohol.  5. Avoid drugs.  6. Tylenol warning.  7. Hepatitis C counseling.  8. Check CBC, CMP, Hepatitis panel, TSH, HCV RNA by PCR with reflex to genotype.  9. Possible non-invasive liver work up in the future.  10. Possible abdominal ultrasound in the future.  11. Upper endoscopy-EGD: Description of the procedure, risks, benefits, alternatives and options, including nonoperative options, were discussed with the patient in detail. The patient understands and wishes to proceed.    The patient was seen along with his wife.

## 2017-10-24 ENCOUNTER — APPOINTMENT (OUTPATIENT)
Dept: GENERAL RADIOLOGY | Facility: HOSPITAL | Age: 32
End: 2017-10-24

## 2017-10-24 ENCOUNTER — APPOINTMENT (OUTPATIENT)
Dept: CT IMAGING | Facility: HOSPITAL | Age: 32
End: 2017-10-24

## 2017-10-24 ENCOUNTER — HOSPITAL ENCOUNTER (EMERGENCY)
Facility: HOSPITAL | Age: 32
Discharge: HOME | End: 2017-10-24
Attending: EMERGENCY MEDICINE | Admitting: EMERGENCY MEDICINE

## 2017-10-24 VITALS
WEIGHT: 205 LBS | RESPIRATION RATE: 20 BRPM | DIASTOLIC BLOOD PRESSURE: 97 MMHG | HEART RATE: 81 BPM | HEIGHT: 72 IN | BODY MASS INDEX: 27.77 KG/M2 | TEMPERATURE: 98.6 F | SYSTOLIC BLOOD PRESSURE: 121 MMHG | OXYGEN SATURATION: 97 %

## 2017-10-24 DIAGNOSIS — R06.6 HICCUPS: Primary | ICD-10-CM

## 2017-10-24 LAB
ALBUMIN SERPL-MCNC: 4.4 G/DL (ref 3.5–5)
ALBUMIN/GLOB SERPL: 1.3 G/DL (ref 1–2)
ALP SERPL-CCNC: 79 U/L (ref 38–126)
ALT SERPL W P-5'-P-CCNC: 70 U/L (ref 13–69)
ANION GAP SERPL CALCULATED.3IONS-SCNC: 15.6 MMOL/L
AST SERPL-CCNC: 59 U/L (ref 15–46)
BASOPHILS # BLD AUTO: 0.04 10*3/MM3 (ref 0–0.2)
BASOPHILS NFR BLD AUTO: 0.5 % (ref 0–2.5)
BILIRUB SERPL-MCNC: 0.5 MG/DL (ref 0.2–1.3)
BUN BLD-MCNC: 16 MG/DL (ref 7–20)
BUN/CREAT SERPL: 20 (ref 6.3–21.9)
CALCIUM SPEC-SCNC: 9.3 MG/DL (ref 8.4–10.2)
CHLORIDE SERPL-SCNC: 101 MMOL/L (ref 98–107)
CO2 SERPL-SCNC: 25 MMOL/L (ref 26–30)
CREAT BLD-MCNC: 0.8 MG/DL (ref 0.6–1.3)
CRP SERPL-MCNC: 3.7 MG/DL (ref 0–1)
DEPRECATED RDW RBC AUTO: 39.8 FL (ref 37–54)
EOSINOPHIL # BLD AUTO: 0.01 10*3/MM3 (ref 0–0.7)
EOSINOPHIL NFR BLD AUTO: 0.1 % (ref 0–7)
ERYTHROCYTE [DISTWIDTH] IN BLOOD BY AUTOMATED COUNT: 12.1 % (ref 11.5–14.5)
GFR SERPL CREATININE-BSD FRML MDRD: 111 ML/MIN/1.73
GLOBULIN UR ELPH-MCNC: 3.5 GM/DL
GLUCOSE BLD-MCNC: 104 MG/DL (ref 74–98)
HCT VFR BLD AUTO: 36.8 % (ref 42–52)
HGB BLD-MCNC: 12.9 G/DL (ref 14–18)
IMM GRANULOCYTES # BLD: 0.04 10*3/MM3 (ref 0–0.06)
IMM GRANULOCYTES NFR BLD: 0.5 % (ref 0–0.6)
LYMPHOCYTES # BLD AUTO: 1.91 10*3/MM3 (ref 0.6–3.4)
LYMPHOCYTES NFR BLD AUTO: 22 % (ref 10–50)
MCH RBC QN AUTO: 31.2 PG (ref 27–31)
MCHC RBC AUTO-ENTMCNC: 35.1 G/DL (ref 30–37)
MCV RBC AUTO: 88.9 FL (ref 80–94)
MONOCYTES # BLD AUTO: 1.29 10*3/MM3 (ref 0–0.9)
MONOCYTES NFR BLD AUTO: 14.8 % (ref 0–12)
NEUTROPHILS # BLD AUTO: 5.4 10*3/MM3 (ref 2–6.9)
NEUTROPHILS NFR BLD AUTO: 62.1 % (ref 37–80)
NRBC BLD MANUAL-RTO: 0 /100 WBC (ref 0–0)
PLATELET # BLD AUTO: 131 10*3/MM3 (ref 130–400)
PMV BLD AUTO: 11.4 FL (ref 6–12)
POTASSIUM BLD-SCNC: 4.6 MMOL/L (ref 3.5–5.1)
PROT SERPL-MCNC: 7.9 G/DL (ref 6.3–8.2)
RBC # BLD AUTO: 4.14 10*6/MM3 (ref 4.7–6.1)
SODIUM BLD-SCNC: 137 MMOL/L (ref 137–145)
WBC NRBC COR # BLD: 8.69 10*3/MM3 (ref 4.8–10.8)

## 2017-10-24 PROCEDURE — 85025 COMPLETE CBC W/AUTO DIFF WBC: CPT | Performed by: NURSE PRACTITIONER

## 2017-10-24 PROCEDURE — 0 IOPAMIDOL 61 % SOLUTION: Performed by: EMERGENCY MEDICINE

## 2017-10-24 PROCEDURE — 25010000002 CHLORPROMAZINE PER 50 MG: Performed by: NURSE PRACTITIONER

## 2017-10-24 PROCEDURE — 71020 HC CHEST PA AND LATERAL: CPT

## 2017-10-24 PROCEDURE — 86140 C-REACTIVE PROTEIN: CPT | Performed by: NURSE PRACTITIONER

## 2017-10-24 PROCEDURE — 96372 THER/PROPH/DIAG INJ SC/IM: CPT

## 2017-10-24 PROCEDURE — 74177 CT ABD & PELVIS W/CONTRAST: CPT

## 2017-10-24 PROCEDURE — 99283 EMERGENCY DEPT VISIT LOW MDM: CPT

## 2017-10-24 PROCEDURE — 80053 COMPREHEN METABOLIC PANEL: CPT | Performed by: NURSE PRACTITIONER

## 2017-10-24 RX ORDER — SODIUM CHLORIDE 0.9 % (FLUSH) 0.9 %
10 SYRINGE (ML) INJECTION AS NEEDED
Status: DISCONTINUED | OUTPATIENT
Start: 2017-10-24 | End: 2017-10-25 | Stop reason: HOSPADM

## 2017-10-24 RX ORDER — CHLORPROMAZINE HYDROCHLORIDE 25 MG/1
25 TABLET, FILM COATED ORAL 3 TIMES DAILY
Qty: 21 TABLET | Refills: 0 | Status: SHIPPED | OUTPATIENT
Start: 2017-10-24 | End: 2018-03-12

## 2017-10-24 RX ORDER — CHLORPROMAZINE HYDROCHLORIDE 25 MG/ML
25 INJECTION INTRAMUSCULAR ONCE
Status: COMPLETED | OUTPATIENT
Start: 2017-10-24 | End: 2017-10-24

## 2017-10-24 RX ADMIN — CHLORPROMAZINE HYDROCHLORIDE 25 MG: 25 INJECTION INTRAMUSCULAR at 21:04

## 2017-10-24 RX ADMIN — IOPAMIDOL 95 ML: 612 INJECTION, SOLUTION INTRAVENOUS at 21:39

## 2017-10-25 NOTE — ED PROVIDER NOTES
Subjective   History of Present Illness  This is a 32-year-old gentleman who comes in today complaining of hiccups.  He states that he has had hiccups intermittently for the past 6 months and this episode has lasted for the last 6 days without any relief.  He does state that his primary care referred him to GI specialist and he was seen yesterday by them however they did not give him any medication to help relieve symptoms and schedule him for an EGD.  He denies any nausea or vomiting he does complain of some epigastric pain with a prolonged hiccups and also some shortness of breath that has persisted since the hiccups that started he denies any fever or chills.  Review of Systems   Constitutional: Negative.    HENT: Negative.    Eyes: Negative.    Respiratory: Negative.    Cardiovascular: Negative.    Gastrointestinal: Positive for abdominal pain.   Genitourinary: Negative.    Skin: Negative.    Neurological: Negative.    Psychiatric/Behavioral: Negative.    All other systems reviewed and are negative.      Past Medical History:   Diagnosis Date   • Acid reflux    • Anxiety    • Depression    • Hepatitis C    • Hypertension    • Insomnia    • Seizure disorder        Allergies   Allergen Reactions   • Gabapentin    • Keflex [Cephalexin] Nausea Only       History reviewed. No pertinent surgical history.    Family History   Problem Relation Age of Onset   • Arthritis Mother    • Mental illness Mother    • Inflammatory bowel disease Mother    • Arthritis Father    • Diabetes Father    • Hypertension Father    • Mental illness Father    • Cancer Neg Hx    • Colon cancer Neg Hx        Social History     Social History   • Marital status:      Spouse name: N/A   • Number of children: N/A   • Years of education: N/A     Social History Main Topics   • Smoking status: Current Every Day Smoker     Packs/day: 1.00     Types: Cigarettes   • Smokeless tobacco: Never Used   • Alcohol use Yes      Comment: social   • Drug  use: No      Comment: previous IV-quit 2015   • Sexual activity: Defer     Other Topics Concern   • None     Social History Narrative           Objective   Physical Exam   Constitutional: He appears well-developed and well-nourished.   Nursing note and vitals reviewed.  GEN: No acute distress  Head: Normocephalic, atraumatic  Eyes: Pupils equal round reactive to light  ENT: Posterior pharynx normal in appearance, oral mucosa is moist  Chest: Nontender to palpation  Cardiovascular: Regular rate  Lungs: Clear to auscultation bilaterally  Abdomen: Soft, tender epigastric, nondistended, no peritoneal signs  Extremities: No edema, normal appearance  Neuro: GCS 15  Psych: Mood and affect are appropriate      Procedures         ED Course  ED Course   Comment By Time   No hiccups after meds. States he has so much relief after meds. Advised plan of care. Will send home with RX of meds to take PO as needed. Return to care instruction given and patient is agreeable with plan of care. Alda Sorto, PANFILO 10/24 2155                  [facility]  Number of Diagnoses or Management Options  Diagnosis management comments: At this time we will start basic evaluation of the cause of his gums since he has had no testing done so far and they have been intractable for the last 6 months.  So we will do a CBC, CMP, CT scan of his abdomen and pelvis to look for any neoplastic causes or any other reasons you have irritation to his phrenic nerve.  We will also give him some Thorazine 25 mg IM and see if this helps eliminate his hiccups.       Amount and/or Complexity of Data Reviewed  Clinical lab tests: ordered and reviewed  Tests in the radiology section of CPT®: ordered and reviewed    Risk of Complications, Morbidity, and/or Mortality  Presenting problems: moderate  Diagnostic procedures: moderate  Management options: moderate        Final diagnoses:   Hiccups            Alda Sorto, PANFILO  10/24/17 [id number]

## 2017-12-01 ENCOUNTER — TELEPHONE (OUTPATIENT)
Dept: GASTROENTEROLOGY | Facility: CLINIC | Age: 32
End: 2017-12-01

## 2017-12-01 NOTE — TELEPHONE ENCOUNTER
Left message to have patient call back to see if he has completed lab work and if he would schedule a follow-up appt.

## 2017-12-16 ENCOUNTER — APPOINTMENT (OUTPATIENT)
Dept: GENERAL RADIOLOGY | Facility: HOSPITAL | Age: 32
End: 2017-12-16

## 2017-12-16 ENCOUNTER — HOSPITAL ENCOUNTER (EMERGENCY)
Facility: HOSPITAL | Age: 32
Discharge: HOME | End: 2017-12-16
Attending: EMERGENCY MEDICINE | Admitting: EMERGENCY MEDICINE

## 2017-12-16 VITALS
HEIGHT: 73 IN | HEART RATE: 93 BPM | SYSTOLIC BLOOD PRESSURE: 117 MMHG | OXYGEN SATURATION: 98 % | TEMPERATURE: 97.8 F | RESPIRATION RATE: 18 BRPM | BODY MASS INDEX: 25.84 KG/M2 | WEIGHT: 195 LBS | DIASTOLIC BLOOD PRESSURE: 75 MMHG

## 2017-12-16 DIAGNOSIS — R45.851 SUICIDAL IDEATION: Primary | ICD-10-CM

## 2017-12-16 DIAGNOSIS — S92.002A CLOSED NONDISPLACED FRACTURE OF LEFT CALCANEUS, UNSPECIFIED PORTION OF CALCANEUS, INITIAL ENCOUNTER: ICD-10-CM

## 2017-12-16 DIAGNOSIS — F10.920 ALCOHOLIC INTOXICATION WITHOUT COMPLICATION (HCC): ICD-10-CM

## 2017-12-16 LAB
ALBUMIN SERPL-MCNC: 5.1 G/DL (ref 3.5–5)
ALBUMIN/GLOB SERPL: 1.4 G/DL (ref 1–2)
ALP SERPL-CCNC: 77 U/L (ref 38–126)
ALT SERPL W P-5'-P-CCNC: 57 U/L (ref 13–69)
AMPHET+METHAMPHET UR QL: NEGATIVE
AMPHETAMINES UR QL: NEGATIVE
ANION GAP SERPL CALCULATED.3IONS-SCNC: 17.8 MMOL/L
APAP SERPL-MCNC: <10 MCG/ML
AST SERPL-CCNC: 48 U/L (ref 15–46)
BARBITURATES UR QL SCN: NEGATIVE
BASOPHILS # BLD AUTO: 0.05 10*3/MM3 (ref 0–0.2)
BASOPHILS NFR BLD AUTO: 0.6 % (ref 0–2.5)
BENZODIAZ UR QL SCN: NEGATIVE
BILIRUB SERPL-MCNC: 0.3 MG/DL (ref 0.2–1.3)
BUN BLD-MCNC: 13 MG/DL (ref 7–20)
BUN/CREAT SERPL: 16.3 (ref 6.3–21.9)
BUPRENORPHINE SERPL-MCNC: POSITIVE NG/ML
CALCIUM SPEC-SCNC: 9.2 MG/DL (ref 8.4–10.2)
CANNABINOIDS SERPL QL: NEGATIVE
CHLORIDE SERPL-SCNC: 102 MMOL/L (ref 98–107)
CO2 SERPL-SCNC: 27 MMOL/L (ref 26–30)
COCAINE UR QL: NEGATIVE
CREAT BLD-MCNC: 0.8 MG/DL (ref 0.6–1.3)
DEPRECATED RDW RBC AUTO: 40 FL (ref 37–54)
EOSINOPHIL # BLD AUTO: 0.06 10*3/MM3 (ref 0–0.7)
EOSINOPHIL NFR BLD AUTO: 0.7 % (ref 0–7)
ERYTHROCYTE [DISTWIDTH] IN BLOOD BY AUTOMATED COUNT: 12.2 % (ref 11.5–14.5)
ETHANOL BLD-MCNC: 130 MG/DL
ETHANOL BLD-MCNC: 214 MG/DL
ETHANOL BLD-MCNC: 223 MG/DL
ETHANOL BLD-MCNC: 96 MG/DL
ETHANOL UR QL: 0.1 %
ETHANOL UR QL: 0.13 %
ETHANOL UR QL: 0.21 %
ETHANOL UR QL: 0.22 %
GFR SERPL CREATININE-BSD FRML MDRD: 111 ML/MIN/1.73
GLOBULIN UR ELPH-MCNC: 3.6 GM/DL
GLUCOSE BLD-MCNC: 87 MG/DL (ref 74–98)
HCT VFR BLD AUTO: 42.4 % (ref 42–52)
HGB BLD-MCNC: 15.1 G/DL (ref 14–18)
HOLD SPECIMEN: NORMAL
HOLD SPECIMEN: NORMAL
IMM GRANULOCYTES # BLD: 0.14 10*3/MM3 (ref 0–0.06)
IMM GRANULOCYTES NFR BLD: 1.6 % (ref 0–0.6)
LYMPHOCYTES # BLD AUTO: 1.79 10*3/MM3 (ref 0.6–3.4)
LYMPHOCYTES NFR BLD AUTO: 21 % (ref 10–50)
MCH RBC QN AUTO: 31.6 PG (ref 27–31)
MCHC RBC AUTO-ENTMCNC: 35.6 G/DL (ref 30–37)
MCV RBC AUTO: 88.7 FL (ref 80–94)
METHADONE UR QL SCN: NEGATIVE
MONOCYTES # BLD AUTO: 0.69 10*3/MM3 (ref 0–0.9)
MONOCYTES NFR BLD AUTO: 8.1 % (ref 0–12)
NEUTROPHILS # BLD AUTO: 5.78 10*3/MM3 (ref 2–6.9)
NEUTROPHILS NFR BLD AUTO: 68 % (ref 37–80)
NRBC BLD MANUAL-RTO: 0 /100 WBC (ref 0–0)
OPIATES UR QL: NEGATIVE
OXYCODONE UR QL SCN: POSITIVE
PCP UR QL SCN: NEGATIVE
PLATELET # BLD AUTO: 174 10*3/MM3 (ref 130–400)
PMV BLD AUTO: 10.9 FL (ref 6–12)
POTASSIUM BLD-SCNC: 3.8 MMOL/L (ref 3.5–5.1)
PROPOXYPH UR QL: NEGATIVE
PROT SERPL-MCNC: 8.7 G/DL (ref 6.3–8.2)
RBC # BLD AUTO: 4.78 10*6/MM3 (ref 4.7–6.1)
SALICYLATES SERPL-MCNC: <1 MG/DL (ref 2.8–20)
SODIUM BLD-SCNC: 143 MMOL/L (ref 137–145)
TRICYCLICS UR QL SCN: NEGATIVE
WBC NRBC COR # BLD: 8.51 10*3/MM3 (ref 4.8–10.8)
WHOLE BLOOD HOLD SPECIMEN: NORMAL
WHOLE BLOOD HOLD SPECIMEN: NORMAL

## 2017-12-16 PROCEDURE — 85025 COMPLETE CBC W/AUTO DIFF WBC: CPT | Performed by: EMERGENCY MEDICINE

## 2017-12-16 PROCEDURE — 80307 DRUG TEST PRSMV CHEM ANLYZR: CPT | Performed by: EMERGENCY MEDICINE

## 2017-12-16 PROCEDURE — 93005 ELECTROCARDIOGRAM TRACING: CPT | Performed by: EMERGENCY MEDICINE

## 2017-12-16 PROCEDURE — 80306 DRUG TEST PRSMV INSTRMNT: CPT | Performed by: EMERGENCY MEDICINE

## 2017-12-16 PROCEDURE — 80053 COMPREHEN METABOLIC PANEL: CPT | Performed by: EMERGENCY MEDICINE

## 2017-12-16 PROCEDURE — 73630 X-RAY EXAM OF FOOT: CPT

## 2017-12-16 PROCEDURE — 73610 X-RAY EXAM OF ANKLE: CPT

## 2017-12-16 PROCEDURE — 99285 EMERGENCY DEPT VISIT HI MDM: CPT

## 2017-12-16 RX ORDER — OXYCODONE HYDROCHLORIDE AND ACETAMINOPHEN 5; 325 MG/1; MG/1
1 TABLET ORAL EVERY 6 HOURS PRN
Qty: 20 TABLET | Refills: 0 | Status: SHIPPED | OUTPATIENT
Start: 2017-12-16 | End: 2018-03-12

## 2017-12-16 RX ORDER — SODIUM CHLORIDE 0.9 % (FLUSH) 0.9 %
10 SYRINGE (ML) INJECTION AS NEEDED
Status: DISCONTINUED | OUTPATIENT
Start: 2017-12-16 | End: 2017-12-16 | Stop reason: HOSPADM

## 2017-12-16 RX ORDER — NICOTINE 21 MG/24HR
1 PATCH, TRANSDERMAL 24 HOURS TRANSDERMAL EVERY 24 HOURS
Status: DISCONTINUED | OUTPATIENT
Start: 2017-12-16 | End: 2017-12-16 | Stop reason: HOSPADM

## 2017-12-16 RX ORDER — IBUPROFEN 600 MG/1
600 TABLET ORAL ONCE
Status: COMPLETED | OUTPATIENT
Start: 2017-12-16 | End: 2017-12-16

## 2017-12-16 RX ORDER — ACETAMINOPHEN 325 MG/1
650 TABLET ORAL ONCE
Status: COMPLETED | OUTPATIENT
Start: 2017-12-16 | End: 2017-12-16

## 2017-12-16 RX ADMIN — NICOTINE 1 PATCH: 14 PATCH TRANSDERMAL at 06:44

## 2017-12-16 RX ADMIN — ACETAMINOPHEN 650 MG: 325 TABLET, FILM COATED ORAL at 04:05

## 2017-12-16 RX ADMIN — IBUPROFEN 600 MG: 600 TABLET ORAL at 04:05

## 2017-12-16 NOTE — ED NOTES
"Patient requested crutches to \"walk around the room\" states \"it helps my anxiety\" went to get crutches for patient, per Brittney CHRISTIAN in report after leaving patient's bedside, patient has requested crutches multiple times but also aggressive towards staff and a risk for safety to himself and others. Expressed to patient if he absolutely needed to get out of bed we would use crutches or wheelchair but at this time he is not supposed to be weight bearing due to new splint that had been placed. Despite being informed multiple times through his stay throughout the morning, patient has still been walking on splint.     According to report per Brittney CHRISTIAN and Jodi CHRISTIAN, patient does have a suboxone strip in his ricci pocket which he told behavioral health navigator about. Was informed in report that patient has attempted multiple times to get different staff members to bring his bag of clothes to the bedside. To avoid potential issues with narcotics being in patient's bag, inquired if patient is prescribed Suboxone, he tells me that \"I used to go to [facility], but I stopped going about a month and a half ago.\" Asked patient if this was part of initial prescription, he denied. Once I informed him that there was not supposed to illegal drugs, alcohol or tobacco in our facility, he became angry raising his voice at me stating \"that's my personal property.\" Pool, security, at bedside through all of this. Discussed with Yash CHRISTIAN, decision to call [employer] made to handle narcotic situation and talk with patient and attempt de-escalation.          Nette Park RN  12/16/17 0832    "

## 2017-12-16 NOTE — ED NOTES
Behavioral health navigator informed of occurrences since she was last with patient. Awaiting alcohol redraw to continue forward with plan of sending patient for inpatient treatment.     Nette Park RN  12/16/17 [id number]

## 2017-12-16 NOTE — ED NOTES
Called dietary for a meal tray at this time. Requested plastic utensils      Zaria Robles  12/16/17 [id number]

## 2017-12-16 NOTE — ED NOTES
[employer] officers x3 came to ED to assess the situation. They were informed of possible narcotics in patient's clothing and of his aggressive behavior towards multiple staff members. [employer] to bedside to speak with patient and evaluate situation. Upon [employer] advisement, if patient were to have a suboxone strip in pants, it should be disposed of. Took patient belonging bag to patient's room along with [employer] to allow him to go through his belongings himself with witnesses present. No drugs found. Patient does have a wallet, knife, bracelet, lighter, clothing and shoes in bag. Belongings returned to nurses station.      Nette Park RN  12/16/17 8217
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
## 2017-12-16 NOTE — CONSULTS
12/16/2017 0400    D:  Patient was me with in the ER at request of ER staff Pako Mann and Brittney Rodriguez RN. Patient was being monitored by security upon arrival. Patient endorses active SI but denies Si once becoming cober. He also endorses a history of substance abuse, primarily with opiates. He engages in daily use at about 15mg\day (more if available) and his date of last use was 12/15/17 (yesterday). He reports that he is a convicted felon, which is his primary life stressor/trigger for current SI as he feels worthless and is struggling in finding employment. He reports being unsure about where he stands in regards to seeking substance abuse treatment.    A: The CSRSS was administered and he is determined to be at low risk for self harm once becoming sober. He presents with normal affect. His mood is hopeless/depressed. He also appears to be anxious. He is actively SI, no HI/halluicnation/other symptoms of mental illness are apparent at this time.     P: Pt is agreeable to discharge, ED medical staff are in agreement.

## 2018-01-19 ENCOUNTER — TELEPHONE (OUTPATIENT)
Dept: GASTROENTEROLOGY | Facility: CLINIC | Age: 33
End: 2018-01-19

## 2018-01-19 NOTE — TELEPHONE ENCOUNTER
CALLING TO SEE IF PATIENT HAS BEEN ABLE TO COMPLETE LAB WORK. PT HAS NO VOICEMAIL. WILL SEND LETTER.

## 2018-02-09 ENCOUNTER — TELEPHONE (OUTPATIENT)
Dept: GASTROENTEROLOGY | Facility: CLINIC | Age: 33
End: 2018-02-09

## 2018-02-09 NOTE — TELEPHONE ENCOUNTER
PT WHO ANSWERED STATED [phone] IS NO LONGER HIS NUMBER. CALLING TO SEE IF PATIENT WOULD LIKE TO RS AND HAVE LAB WORK DONE. WILL MAIL LETTER.

## 2018-02-15 DIAGNOSIS — I10 ESSENTIAL HYPERTENSION: ICD-10-CM

## 2018-02-15 DIAGNOSIS — K21.9 GASTROESOPHAGEAL REFLUX DISEASE WITHOUT ESOPHAGITIS: ICD-10-CM

## 2018-02-15 DIAGNOSIS — F41.8 DEPRESSION WITH ANXIETY: ICD-10-CM

## 2018-02-15 DIAGNOSIS — G40.909 SEIZURE DISORDER (HCC): ICD-10-CM

## 2018-02-15 RX ORDER — OMEPRAZOLE 40 MG/1
CAPSULE, DELAYED RELEASE ORAL
Qty: 30 CAPSULE | Refills: 0 | Status: SHIPPED | OUTPATIENT
Start: 2018-02-15 | End: 2018-04-08 | Stop reason: SDUPTHER

## 2018-02-15 RX ORDER — CARBAMAZEPINE 200 MG/1
TABLET ORAL
Qty: 60 TABLET | Refills: 0 | Status: SHIPPED | OUTPATIENT
Start: 2018-02-15 | End: 2018-08-03 | Stop reason: SDUPTHER

## 2018-02-15 RX ORDER — FLUOXETINE HYDROCHLORIDE 40 MG/1
CAPSULE ORAL
Qty: 30 CAPSULE | Refills: 0 | Status: SHIPPED | OUTPATIENT
Start: 2018-02-15 | End: 2018-03-12

## 2018-02-15 RX ORDER — LISINOPRIL 5 MG/1
TABLET ORAL
Qty: 30 TABLET | Refills: 0 | Status: SHIPPED | OUTPATIENT
Start: 2018-02-15 | End: 2018-05-06 | Stop reason: SDUPTHER

## 2018-03-10 DIAGNOSIS — F41.8 DEPRESSION WITH ANXIETY: ICD-10-CM

## 2018-03-10 DIAGNOSIS — K21.9 GASTROESOPHAGEAL REFLUX DISEASE WITHOUT ESOPHAGITIS: ICD-10-CM

## 2018-03-10 DIAGNOSIS — I10 ESSENTIAL HYPERTENSION: ICD-10-CM

## 2018-03-10 DIAGNOSIS — G40.909 SEIZURE DISORDER (HCC): ICD-10-CM

## 2018-03-12 ENCOUNTER — OFFICE VISIT (OUTPATIENT)
Dept: INTERNAL MEDICINE | Facility: CLINIC | Age: 33
End: 2018-03-12

## 2018-03-12 VITALS
HEIGHT: 73 IN | WEIGHT: 195 LBS | SYSTOLIC BLOOD PRESSURE: 108 MMHG | TEMPERATURE: 98.1 F | BODY MASS INDEX: 25.84 KG/M2 | HEART RATE: 82 BPM | DIASTOLIC BLOOD PRESSURE: 66 MMHG | OXYGEN SATURATION: 99 %

## 2018-03-12 DIAGNOSIS — F41.8 DEPRESSION WITH ANXIETY: Primary | ICD-10-CM

## 2018-03-12 DIAGNOSIS — J30.89 CHRONIC NON-SEASONAL ALLERGIC RHINITIS, UNSPECIFIED TRIGGER: ICD-10-CM

## 2018-03-12 DIAGNOSIS — F51.04 PSYCHOPHYSIOLOGICAL INSOMNIA: ICD-10-CM

## 2018-03-12 PROCEDURE — 99214 OFFICE O/P EST MOD 30 MIN: CPT | Performed by: PHYSICIAN ASSISTANT

## 2018-03-12 RX ORDER — CARBAMAZEPINE 200 MG/1
TABLET ORAL
Qty: 60 TABLET | Refills: 0 | OUTPATIENT
Start: 2018-03-12

## 2018-03-12 RX ORDER — BUSPIRONE HYDROCHLORIDE 10 MG/1
10 TABLET ORAL 3 TIMES DAILY PRN
Qty: 90 TABLET | Refills: 2 | Status: SHIPPED | OUTPATIENT
Start: 2018-03-12 | End: 2018-06-07 | Stop reason: SDUPTHER

## 2018-03-12 RX ORDER — LISINOPRIL 5 MG/1
TABLET ORAL
Qty: 30 TABLET | Refills: 0 | OUTPATIENT
Start: 2018-03-12

## 2018-03-12 RX ORDER — ESCITALOPRAM OXALATE 20 MG/1
20 TABLET ORAL DAILY
Qty: 30 TABLET | Refills: 5 | Status: SHIPPED | OUTPATIENT
Start: 2018-03-12 | End: 2018-03-19

## 2018-03-12 RX ORDER — DOXEPIN HYDROCHLORIDE 10 MG/1
10 CAPSULE ORAL NIGHTLY PRN
Qty: 30 CAPSULE | Refills: 5 | Status: SHIPPED | OUTPATIENT
Start: 2018-03-12 | End: 2018-12-02 | Stop reason: SDUPTHER

## 2018-03-12 RX ORDER — OMEPRAZOLE 40 MG/1
CAPSULE, DELAYED RELEASE ORAL
Qty: 30 CAPSULE | Refills: 0 | OUTPATIENT
Start: 2018-03-12

## 2018-03-12 RX ORDER — FEXOFENADINE HCL 180 MG/1
180 TABLET ORAL DAILY
Qty: 30 TABLET | Refills: 12 | Status: SHIPPED | OUTPATIENT
Start: 2018-03-12 | End: 2019-03-25 | Stop reason: SDUPTHER

## 2018-03-12 RX ORDER — FLUOXETINE HYDROCHLORIDE 40 MG/1
CAPSULE ORAL
Qty: 30 CAPSULE | Refills: 0 | OUTPATIENT
Start: 2018-03-12

## 2018-03-12 NOTE — PROGRESS NOTES
"Rafael Todd is a 33 y.o. male.     Subjective   History of Present Illness   Here today for follow up of depression and anxiety. He has been using hydroxyzine which has not helped with anxiety or insomnia. He continued to have anxiety attacks and he feels they are worsening. He is also easily agitated.  He does not feel that Prozac is helping him anymore but did for a long time. He has previously used Paxil and Celexa which were not efficacious, Effexor which caused dizziness with nausea, and Zoloft which made him feel \"crazy\".  He denies SI or HI.           The following portions of the patient's history were reviewed and updated as appropriate: allergies, current medications, past family history, past medical history, past social history, past surgical history and problem list.    Review of Systems    Constitutional: Negative for appetite change, chills, fatigue, fever and unexpected weight change.   HENT: Negative for congestion, ear pain, hearing loss, nosebleeds, postnasal drip, rhinorrhea, sore throat, tinnitus and trouble swallowing.    Eyes: Negative for photophobia, discharge and visual disturbance.   Respiratory: Negative for cough, chest tightness, shortness of breath and wheezing.    Cardiovascular: Negative for chest pain, palpitations and leg swelling.   Gastrointestinal: Negative for abdominal distention, abdominal pain, blood in stool, constipation, diarrhea, nausea and vomiting.   Endocrine: Negative for cold intolerance, heat intolerance, polydipsia, polyphagia and polyuria.   Musculoskeletal: Negative for arthralgias, back pain, joint swelling, myalgias, neck pain and neck stiffness.   Skin: Negative for color change, pallor, rash and wound.   Allergic/Immunologic: Negative for environmental allergies, food allergies and immunocompromised state.   Neurological: Negative for dizziness, tremors, seizures, weakness, numbness and headaches.   Hematological: Negative for adenopathy. Does not " "bruise/bleed easily.   Psychiatric/Behavioral: anxiety, sleep disturbances, agitation. Negative for behavioral problems, confusion, hallucinations, self-injury and suicidal ideas.     Objective     Physical Exam  Constitutional: Oriented to person, place, and time. Appears well-developed and well-nourished.   HENT:   Head: Normocephalic and atraumatic.   Eyes: EOM are normal. Pupils are equal, round, and reactive to light.   Neck: Normal range of motion. Neck supple.   Cardiovascular: Normal rate, regular rhythm and normal heart sounds.    Pulmonary/Chest: Effort normal and breath sounds normal. No respiratory distress.  Has no wheezes or rales. Exhibits no chest wall tenderness.   Abdominal: Soft. Bowel sounds are normal. Exhibits no distension and no mass. There is no tenderness.   Musculoskeletal: Normal range of motion. Exhibits no tenderness.   Neurological: Alert and oriented to person, place, and time.   Skin: Skin is warm and dry.   Psychiatric: Has a normal mood and affect. Behavior is normal. Judgment and thought content normal.       /66   Pulse 82   Temp 98.1 °F (36.7 °C)   Ht 185.4 cm (72.99\")   Wt 88.5 kg (195 lb)   SpO2 99%   BMI 25.73 kg/m²     Nursing note and vitals reviewed.        Assessment/Plan   Rafael was seen today for follow-up, anxiety and depression.    Diagnoses and all orders for this visit:    Depression with anxiety  -     Ambulatory Referral to Psychiatry  -     Begin prn: busPIRone (BUSPAR) 10 MG tablet; Take 1 tablet by mouth 3 (Three) Times a Day As Needed (anxiety).  -     escitalopram (LEXAPRO) 20 MG tablet; Take 1 tablet by mouth Daily.  Discontinue Prozac and begin Lexapro with 1/2 tablet daily for 3-7 days then increase to full tablet.     Psychophysiological insomnia  -     Begin prn: doxepin (SINEquan) 10 MG capsule; Take 1 capsule by mouth At Night As Needed for Sleep.      F/u 6 months or sooner if needed.              "

## 2018-03-19 ENCOUNTER — HOSPITAL ENCOUNTER (EMERGENCY)
Facility: HOSPITAL | Age: 33
Discharge: HOME | End: 2018-03-19
Attending: EMERGENCY MEDICINE | Admitting: EMERGENCY MEDICINE

## 2018-03-19 VITALS
DIASTOLIC BLOOD PRESSURE: 88 MMHG | RESPIRATION RATE: 20 BRPM | HEIGHT: 72 IN | SYSTOLIC BLOOD PRESSURE: 129 MMHG | TEMPERATURE: 98.2 F | BODY MASS INDEX: 26.41 KG/M2 | WEIGHT: 195 LBS | HEART RATE: 79 BPM | OXYGEN SATURATION: 100 %

## 2018-03-19 DIAGNOSIS — K02.9 DENTAL CARIES: ICD-10-CM

## 2018-03-19 DIAGNOSIS — J06.9 UPPER RESPIRATORY INFECTION, ACUTE: Primary | ICD-10-CM

## 2018-03-19 DIAGNOSIS — K05.00 GINGIVITIS, ACUTE, PLAQUE INDUCED: ICD-10-CM

## 2018-03-19 LAB
FLUAV AG NPH QL: NEGATIVE
FLUBV AG NPH QL IA: NEGATIVE
S PYO AG THROAT QL: NEGATIVE

## 2018-03-19 PROCEDURE — 87880 STREP A ASSAY W/OPTIC: CPT | Performed by: EMERGENCY MEDICINE

## 2018-03-19 PROCEDURE — 99283 EMERGENCY DEPT VISIT LOW MDM: CPT

## 2018-03-19 PROCEDURE — 87804 INFLUENZA ASSAY W/OPTIC: CPT | Performed by: EMERGENCY MEDICINE

## 2018-03-19 PROCEDURE — 87081 CULTURE SCREEN ONLY: CPT | Performed by: EMERGENCY MEDICINE

## 2018-03-19 RX ORDER — IBUPROFEN 800 MG/1
800 TABLET ORAL ONCE
Status: COMPLETED | OUTPATIENT
Start: 2018-03-19 | End: 2018-03-19

## 2018-03-19 RX ORDER — AMOXICILLIN 500 MG/1
500 CAPSULE ORAL ONCE
Status: COMPLETED | OUTPATIENT
Start: 2018-03-19 | End: 2018-03-19

## 2018-03-19 RX ORDER — NAPROXEN 500 MG/1
500 TABLET ORAL 2 TIMES DAILY PRN
Qty: 14 TABLET | Refills: 0 | Status: SHIPPED | OUTPATIENT
Start: 2018-03-19 | End: 2018-11-09 | Stop reason: ALTCHOICE

## 2018-03-19 RX ORDER — AMOXICILLIN 500 MG/1
500 CAPSULE ORAL 3 TIMES DAILY
Qty: 30 CAPSULE | Refills: 0 | Status: SHIPPED | OUTPATIENT
Start: 2018-03-19 | End: 2018-11-09

## 2018-03-19 RX ADMIN — AMOXICILLIN 500 MG: 500 CAPSULE ORAL at 21:18

## 2018-03-19 RX ADMIN — IBUPROFEN 800 MG: 800 TABLET, FILM COATED ORAL at 21:18
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
## 2018-03-20 NOTE — DISCHARGE INSTRUCTIONS
Off work today.    Return to the ER for marked facial swelling, high fevers, marked trouble swallowing or trouble breathing.    Follow-up with your dentist and your family doctor for definitive management in 2-3 days.  Call for appointments.

## 2018-03-20 NOTE — ED NOTES
Pt triaged, no needs at this time.  Updated on what was going to happen next.      Alpa Kay RN  03/19/18 [id number]

## 2018-03-21 LAB — BACTERIA SPEC AEROBE CULT: NORMAL

## 2018-03-30 ENCOUNTER — TELEPHONE (OUTPATIENT)
Dept: INTERNAL MEDICINE | Facility: CLINIC | Age: 33
End: 2018-03-30

## 2018-03-30 RX ORDER — FLUOXETINE HYDROCHLORIDE 20 MG/1
20 CAPSULE ORAL DAILY
Qty: 30 CAPSULE | Refills: 5 | Status: SHIPPED | OUTPATIENT
Start: 2018-03-30 | End: 2019-01-03 | Stop reason: SDUPTHER

## 2018-04-08 DIAGNOSIS — K21.9 GASTROESOPHAGEAL REFLUX DISEASE WITHOUT ESOPHAGITIS: ICD-10-CM

## 2018-04-09 RX ORDER — OMEPRAZOLE 40 MG/1
CAPSULE, DELAYED RELEASE ORAL
Qty: 30 CAPSULE | Refills: 0 | Status: SHIPPED | OUTPATIENT
Start: 2018-04-09 | End: 2018-05-03 | Stop reason: SDUPTHER

## 2018-05-03 DIAGNOSIS — K21.9 GASTROESOPHAGEAL REFLUX DISEASE WITHOUT ESOPHAGITIS: ICD-10-CM

## 2018-05-03 RX ORDER — OMEPRAZOLE 40 MG/1
CAPSULE, DELAYED RELEASE ORAL
Qty: 30 CAPSULE | Refills: 4 | Status: SHIPPED | OUTPATIENT
Start: 2018-05-03 | End: 2018-11-24 | Stop reason: SDUPTHER

## 2018-05-06 DIAGNOSIS — I10 ESSENTIAL HYPERTENSION: ICD-10-CM

## 2018-05-07 RX ORDER — LISINOPRIL 5 MG/1
TABLET ORAL
Qty: 30 TABLET | Refills: 5 | Status: SHIPPED | OUTPATIENT
Start: 2018-05-07 | End: 2018-12-02 | Stop reason: SDUPTHER

## 2018-06-07 DIAGNOSIS — F41.8 DEPRESSION WITH ANXIETY: ICD-10-CM

## 2018-06-07 RX ORDER — BUSPIRONE HYDROCHLORIDE 10 MG/1
TABLET ORAL
Qty: 90 TABLET | Refills: 0 | Status: SHIPPED | OUTPATIENT
Start: 2018-06-07 | End: 2018-07-11 | Stop reason: SDUPTHER

## 2018-06-13 VITALS
HEART RATE: 79 BPM | OXYGEN SATURATION: 99 % | BODY MASS INDEX: 26.49 KG/M2 | SYSTOLIC BLOOD PRESSURE: 128 MMHG | RESPIRATION RATE: 16 BRPM | HEIGHT: 72 IN | WEIGHT: 195.6 LBS | TEMPERATURE: 98.1 F | DIASTOLIC BLOOD PRESSURE: 78 MMHG

## 2018-06-13 PROCEDURE — 99283 EMERGENCY DEPT VISIT LOW MDM: CPT

## 2018-06-14 ENCOUNTER — HOSPITAL ENCOUNTER (EMERGENCY)
Facility: HOSPITAL | Age: 33
Discharge: HOME | End: 2018-06-14
Attending: EMERGENCY MEDICINE | Admitting: EMERGENCY MEDICINE

## 2018-06-14 DIAGNOSIS — J01.90 ACUTE SINUSITIS, RECURRENCE NOT SPECIFIED, UNSPECIFIED LOCATION: Primary | ICD-10-CM

## 2018-06-14 DIAGNOSIS — K02.9 INFECTED DENTAL CARIES: ICD-10-CM

## 2018-06-14 DIAGNOSIS — J20.9 ACUTE BRONCHITIS WITH BRONCHOSPASM: ICD-10-CM

## 2018-06-14 DIAGNOSIS — K04.7 INFECTED DENTAL CARIES: ICD-10-CM

## 2018-06-14 RX ORDER — AZITHROMYCIN 250 MG/1
TABLET, FILM COATED ORAL
Qty: 4 TABLET | Refills: 0 | Status: SHIPPED | OUTPATIENT
Start: 2018-06-14 | End: 2018-11-09

## 2018-06-14 RX ORDER — NAPROXEN 500 MG/1
500 TABLET ORAL 2 TIMES DAILY PRN
Qty: 14 TABLET | Refills: 0 | Status: SHIPPED | OUTPATIENT
Start: 2018-06-14 | End: 2018-11-09 | Stop reason: SDUPTHER

## 2018-06-14 RX ORDER — AZITHROMYCIN 250 MG/1
500 TABLET, FILM COATED ORAL ONCE
Status: COMPLETED | OUTPATIENT
Start: 2018-06-14 | End: 2018-06-14

## 2018-06-14 RX ORDER — ALBUTEROL SULFATE 90 UG/1
2 AEROSOL, METERED RESPIRATORY (INHALATION) EVERY 4 HOURS PRN
Qty: 1 INHALER | Refills: 0 | Status: SHIPPED | OUTPATIENT
Start: 2018-06-14 | End: 2018-11-09 | Stop reason: SDUPTHER

## 2018-06-14 RX ORDER — NAPROXEN 500 MG/1
500 TABLET ORAL ONCE
Status: COMPLETED | OUTPATIENT
Start: 2018-06-14 | End: 2018-06-14

## 2018-06-14 RX ADMIN — AZITHROMYCIN MONOHYDRATE 500 MG: 250 TABLET ORAL at 01:33

## 2018-06-14 RX ADMIN — NAPROXEN 500 MG: 500 TABLET ORAL at 01:33

## 2018-06-14 NOTE — DISCHARGE INSTRUCTIONS
Follow-up with your dentist as soon as possible for definitive management your teeth.    Follow-up with your family doctor in one to 2 days for further workup, treatment and evaluation if not improving.  Call for appointment.    Off work this evening.
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
## 2018-07-11 DIAGNOSIS — F41.8 DEPRESSION WITH ANXIETY: ICD-10-CM

## 2018-07-11 RX ORDER — BUSPIRONE HYDROCHLORIDE 10 MG/1
TABLET ORAL
Qty: 90 TABLET | Refills: 1 | Status: SHIPPED | OUTPATIENT
Start: 2018-07-11 | End: 2018-10-17 | Stop reason: SDUPTHER

## 2018-07-11 RX ORDER — HYDROXYZINE HYDROCHLORIDE 25 MG/1
TABLET, FILM COATED ORAL
Qty: 60 TABLET | Refills: 1 | Status: SHIPPED | OUTPATIENT
Start: 2018-07-11 | End: 2018-11-09

## 2018-08-03 DIAGNOSIS — G40.909 SEIZURE DISORDER (HCC): ICD-10-CM

## 2018-08-03 RX ORDER — CARBAMAZEPINE 200 MG/1
TABLET ORAL
Qty: 60 TABLET | Refills: 0 | Status: SHIPPED | OUTPATIENT
Start: 2018-08-03 | End: 2018-08-06 | Stop reason: SDUPTHER

## 2018-08-06 DIAGNOSIS — G40.909 SEIZURE DISORDER (HCC): ICD-10-CM

## 2018-08-06 RX ORDER — CARBAMAZEPINE 200 MG/1
TABLET ORAL
Qty: 60 TABLET | Refills: 0 | Status: SHIPPED | OUTPATIENT
Start: 2018-08-06 | End: 2018-12-27 | Stop reason: SDUPTHER

## 2018-08-28 ENCOUNTER — TELEPHONE (OUTPATIENT)
Dept: INTERNAL MEDICINE | Facility: CLINIC | Age: 33
End: 2018-08-28

## 2018-08-28 NOTE — TELEPHONE ENCOUNTER
"Patient's mother called and advised that the patient is working out of town in [place] and when she spoke with him last night he told her that he was feeling \"smothered\" and having chest pain. She said that she advised him to go to the hospital, but he did not. I strongly suggested that the patient needs to go to the nearest ER and get checked out. She then told me that he has panic attacks and he thinks he had one last night. She requested to schedule an appt with Dr. York or Emmie for this Friday, he has been scheduled with Emmie. I again strongly suggested that if her son is still having \"smothering\" feeling and chest pain that he needs to go to the ER. She verbalized understanding, and advised that the patient returns home from work on Thursday evening.  "

## 2018-08-30 DIAGNOSIS — G40.909 SEIZURE DISORDER (HCC): ICD-10-CM

## 2018-08-30 RX ORDER — CARBAMAZEPINE 200 MG/1
TABLET ORAL
Qty: 60 TABLET | Refills: 0 | Status: SHIPPED | OUTPATIENT
Start: 2018-08-30 | End: 2018-11-09 | Stop reason: SDUPTHER

## 2018-10-17 DIAGNOSIS — F41.8 DEPRESSION WITH ANXIETY: ICD-10-CM

## 2018-10-17 RX ORDER — BUSPIRONE HYDROCHLORIDE 10 MG/1
10 TABLET ORAL 3 TIMES DAILY PRN
Qty: 90 TABLET | Refills: 0 | Status: SHIPPED | OUTPATIENT
Start: 2018-10-17 | End: 2018-12-02 | Stop reason: SDUPTHER

## 2018-11-09 ENCOUNTER — OFFICE VISIT (OUTPATIENT)
Dept: INTERNAL MEDICINE | Facility: CLINIC | Age: 33
End: 2018-11-09

## 2018-11-09 VITALS
HEART RATE: 72 BPM | RESPIRATION RATE: 16 BRPM | HEIGHT: 72 IN | OXYGEN SATURATION: 98 % | BODY MASS INDEX: 26.14 KG/M2 | SYSTOLIC BLOOD PRESSURE: 130 MMHG | WEIGHT: 193 LBS | TEMPERATURE: 97.7 F | DIASTOLIC BLOOD PRESSURE: 96 MMHG

## 2018-11-09 DIAGNOSIS — J30.2 SEASONAL ALLERGIC RHINITIS, UNSPECIFIED TRIGGER: ICD-10-CM

## 2018-11-09 DIAGNOSIS — K04.7 DENTAL ABSCESS: Primary | ICD-10-CM

## 2018-11-09 DIAGNOSIS — R07.89 CHEST PAIN, ATYPICAL: ICD-10-CM

## 2018-11-09 DIAGNOSIS — M25.512 ACUTE PAIN OF LEFT SHOULDER: ICD-10-CM

## 2018-11-09 DIAGNOSIS — F41.8 DEPRESSION WITH ANXIETY: ICD-10-CM

## 2018-11-09 PROCEDURE — 93000 ELECTROCARDIOGRAM COMPLETE: CPT | Performed by: PHYSICIAN ASSISTANT

## 2018-11-09 PROCEDURE — 99214 OFFICE O/P EST MOD 30 MIN: CPT | Performed by: PHYSICIAN ASSISTANT

## 2018-11-09 RX ORDER — IBUPROFEN 800 MG/1
800 TABLET ORAL EVERY 8 HOURS PRN
Qty: 30 TABLET | Refills: 6 | Status: SHIPPED | OUTPATIENT
Start: 2018-11-09 | End: 2019-02-13 | Stop reason: SDUPTHER

## 2018-11-09 RX ORDER — FLUTICASONE PROPIONATE 50 MCG
2 SPRAY, SUSPENSION (ML) NASAL DAILY
Qty: 1 BOTTLE | Refills: 3 | Status: SHIPPED | OUTPATIENT
Start: 2018-11-09 | End: 2019-03-11 | Stop reason: SDUPTHER

## 2018-11-09 RX ORDER — ALBUTEROL SULFATE 90 UG/1
2 AEROSOL, METERED RESPIRATORY (INHALATION) EVERY 4 HOURS PRN
Qty: 1 INHALER | Refills: 3 | Status: SHIPPED | OUTPATIENT
Start: 2018-11-09 | End: 2019-02-11 | Stop reason: CLARIF

## 2018-11-09 RX ORDER — BUSPIRONE HYDROCHLORIDE 10 MG/1
10 TABLET ORAL 3 TIMES DAILY PRN
Qty: 90 TABLET | Refills: 0 | Status: CANCELLED | OUTPATIENT
Start: 2018-11-09

## 2018-11-09 RX ORDER — ALBUTEROL SULFATE 90 UG/1
2 AEROSOL, METERED RESPIRATORY (INHALATION) EVERY 4 HOURS PRN
Qty: 1 INHALER | Refills: 3 | Status: SHIPPED | OUTPATIENT
Start: 2018-11-09 | End: 2018-11-09 | Stop reason: SDUPTHER

## 2018-11-09 RX ORDER — AMOXICILLIN AND CLAVULANATE POTASSIUM 875; 125 MG/1; MG/1
1 TABLET, FILM COATED ORAL 2 TIMES DAILY
Qty: 20 TABLET | Refills: 0 | Status: SHIPPED | OUTPATIENT
Start: 2018-11-09 | End: 2018-11-19

## 2018-11-09 NOTE — PROGRESS NOTES
Chief Complaint   Patient presents with   • Dental Pain     infection    • Shoulder Pain     sore with or without movement x2weeks       Subjective   Rafael Todd is a 33 y.o. male    History of Present Illness     Left upper tooth pain:  Chronic, approximately 1 year in duration, of tooth pain.  Achy dull pain that is sometimes sharp.  Recently he was eating some peanut butter crackers and felt a portion of the tooth break off.  This has increased the pain and caused it to be sharp in nature.  Denies any fevers, chills.  He has felt some nausea, no vomiting or diarrhea.  Gums have remained swollen.  No noticiable swelling to his jaw/face.      Shoulder pain, left:  Denies any acute injury.  Pain has been occurring for 2 weeks.  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  He reports the pain as persistent and he will have additional shooting pain down the shoulder and into the elbow.  He has taken OTC pain relief, that has some improvement.  He reports the pain never really goes away.     History of Anxiety:  Chronic problem.  He has tried several different treatments with minimal relief.  Recently he has noticed some chest pain and shortness of breath.  He doesn't know if this is anxiety related or not.  This has been occurring intermittently for 1 month.  He noticed the breathing first.   He has some mid-sternal chest pain when the shortness of breath occurs. He describes the pain as a heaviness that can also be sharp in nature.  He reports that it resolves on its own, will last a few seconds at a time.  It has been occurring at least once a day.  He has had episodes of anxiety that has caused him to be sweaty and pale with shakiness in the past.  His significant other in the room adds that his dad passed away from cancer within the year and he has been quite upset over this    Seasonal Allergies:  Patient reports long term use of Allegra.  He continues to have post nasal drip.  The allegra  tends to help with the itchy eyes and sneezing.  He has tried other anti-histamines, but they do not help.  He denies any tenderness to the sinuses, fever, or chills.  Patient is a current smoker.      Past Medical History:   Diagnosis Date   • Acid reflux    • Anxiety    • Depression    • Hepatitis C    • History of being tatooed    • Hypertension    • Insomnia    • Seizure disorder (CMS/HCC)     LAST SEIZURE FALL OF 2012     Past Surgical History:   Procedure Laterality Date   • WISDOM TOOTH EXTRACTION       Family History   Problem Relation Age of Onset   • Arthritis Mother    • Mental illness Mother    • Inflammatory bowel disease Mother    • Arthritis Father    • Diabetes Father    • Hypertension Father    • Mental illness Father    • Cancer Neg Hx    • Colon cancer Neg Hx      Social History     Social History   • Marital status:      Spouse name: N/A   • Number of children: N/A   • Years of education: N/A     Occupational History   • Not on file.     Social History Main Topics   • Smoking status: Current Every Day Smoker     Packs/day: 1.00     Years: 21.00     Types: Cigarettes   • Smokeless tobacco: Never Used   • Alcohol use No   • Drug use: Yes     Types: Heroin, Oxycodone      Comment: previous IV-quit 2015   • Sexual activity: Defer     Other Topics Concern   • Not on file     Social History Narrative   • No narrative on file     Allergies   Allergen Reactions   • Gabapentin Other (See Comments)     SEIZURES     • Keflex [Cephalexin] Nausea Only     Review of Systems   HENT: Positive for postnasal drip and sneezing. Negative for mouth sores.    Respiratory: Positive for shortness of breath.    Cardiovascular: Positive for chest pain.   Gastrointestinal: Negative for nausea and vomiting.   Genitourinary: Negative for dysuria.   Neurological: Negative for dizziness, light-headedness and headache.   Psychiatric/Behavioral: Negative for agitation and behavioral problems.     Objective     Vitals:     11/09/18 1044   BP: 130/96   Pulse: 72   Resp: 16   Temp: 97.7 °F (36.5 °C)   SpO2: 98%         Physical Exam   Constitutional: He is oriented to person, place, and time. He appears well-developed and well-nourished. No distress.   Non-Toxic   HENT:   Head: Normocephalic and atraumatic.   Mouth/Throat: Uvula is midline. Abnormal dentition. Dental abscesses and dental caries present. No oropharyngeal exudate or posterior oropharyngeal edema.   Multiple teeth are missing.  The left upper jaw, back molar, shows erythema and edema around the gum line.  Multiple dental caries are noted throughout.     Eyes: Conjunctivae and EOM are normal.   Neck: Normal range of motion. Neck supple.   Cardiovascular: Normal rate, regular rhythm, normal heart sounds and intact distal pulses.  Exam reveals no gallop and no friction rub.    No murmur heard.  Pulmonary/Chest: Effort normal and breath sounds normal. No respiratory distress. He has no wheezes. He has no rales.   Musculoskeletal: Normal range of motion. He exhibits no edema.   Patient reports pain to left anterior shoulder   Lymphadenopathy:        Head (right side): No submental and no submandibular adenopathy present.        Head (left side): No submental and no submandibular adenopathy present.     He has no cervical adenopathy.   Neurological: He is alert and oriented to person, place, and time.   Skin: Skin is warm and dry. Capillary refill takes less than 2 seconds. He is not diaphoretic.   Psychiatric: He has a normal mood and affect. His behavior is normal.   Nursing note and vitals reviewed.      ECG 12 Lead  Date/Time: 11/9/2018 12:07 PM  Performed by: BERTRAND BLACK  Authorized by: BERTRAND BLACK   Comparison: not compared with previous ECG   Previous ECG: no previous ECG available  Rhythm: sinus rhythm  Rate: normal  BPM: 69  ST Segments: ST segments normal  T Waves: T waves normal  Other: no other findings  Clinical impression: normal ECG  Comments: Without  ectopy or ST changes          Assessment/Plan     Rafael was seen today for dental pain and shoulder pain.    Diagnoses and all orders for this visit:    Dental abscess  -     Have spoken to patient in detail about the importance of proper definitive dental treatment.  I provided a possible low cost/free option at the [facility].  Patient agrees that          he will seek medical treatment immediatly for this dental pain.    -     amoxicillin-clavulanate (AUGMENTIN) 875-125 MG per tablet; Take 1 tablet by mouth 2 (Two) Times a Day for 10 days.    Acute pain of left shoulder  -     Most likely an overuse injury as patient can not account for any traumatic injury.  Will treat with conservatives measure and monitor for improvement.  Patient can use Ibuprofen as needed for pain,       alternate ice/heat, and practice proper body mechanics at work.    -     ibuprofen (ADVIL,MOTRIN) 800 MG tablet; Take 1 tablet by mouth Every 8 (Eight) Hours As Needed for Mild Pain  or Moderate Pain .    Seasonal allergic rhinitis, unspecified trigger  -     Patient will begin Flonase to help with seasonal allergies.  Continue Allegra.  He reports that he has used an albuterol inhaler in the past for when his allergies are really bad.  Can continue this as well.  -     fluticasone (FLONASE) 50 MCG/ACT nasal spray; 2 sprays into the nostril(s) as directed by provider Daily.  -     albuterol (PROVENTIL HFA;VENTOLIN HFA) 108 (90 Base) MCG/ACT inhaler; Inhale 2 puffs Every 4 (Four) Hours As Needed for Wheezing.    Depression with anxiety        -     Will begins taking the Buspar 10mg daily in the evening.  He can additionally use 10mg PRN during the day.   Will have f/u to see how this is helping his anxiety.  May consider starting Effexor.      Chest pain, atypical         -     ECG 12 Lead         -     Preformed EKG today in office.  Normal EKG.  May consider cardiology referral if he continues to have chest  pains.  Patient vocalizes that it could be due to his anxiety.  Will also continue to monitor              the control of his anxiety at next appointment.         Return in about 4 weeks (around 12/7/2018) for Recheck.    Zaria Suggs PA-C

## 2018-11-24 DIAGNOSIS — K21.9 GASTROESOPHAGEAL REFLUX DISEASE WITHOUT ESOPHAGITIS: ICD-10-CM

## 2018-11-26 RX ORDER — OMEPRAZOLE 40 MG/1
CAPSULE, DELAYED RELEASE ORAL
Qty: 30 CAPSULE | Refills: 0 | Status: SHIPPED | OUTPATIENT
Start: 2018-11-26 | End: 2018-12-26 | Stop reason: SDUPTHER

## 2018-12-02 DIAGNOSIS — I10 ESSENTIAL HYPERTENSION: ICD-10-CM

## 2018-12-02 DIAGNOSIS — F51.04 PSYCHOPHYSIOLOGICAL INSOMNIA: ICD-10-CM

## 2018-12-02 DIAGNOSIS — G40.909 SEIZURE DISORDER (HCC): ICD-10-CM

## 2018-12-02 DIAGNOSIS — F41.8 DEPRESSION WITH ANXIETY: ICD-10-CM

## 2018-12-04 RX ORDER — DOXEPIN HYDROCHLORIDE 10 MG/1
10 CAPSULE ORAL NIGHTLY PRN
Qty: 30 CAPSULE | Refills: 1 | Status: SHIPPED | OUTPATIENT
Start: 2018-12-04 | End: 2019-05-21

## 2018-12-04 RX ORDER — CARBAMAZEPINE 200 MG/1
TABLET ORAL
Qty: 60 TABLET | Refills: 0 | OUTPATIENT
Start: 2018-12-04

## 2018-12-04 RX ORDER — BUSPIRONE HYDROCHLORIDE 10 MG/1
TABLET ORAL
Qty: 90 TABLET | Refills: 1 | Status: SHIPPED | OUTPATIENT
Start: 2018-12-04 | End: 2019-05-21

## 2018-12-04 RX ORDER — LISINOPRIL 5 MG/1
TABLET ORAL
Qty: 30 TABLET | Refills: 1 | Status: SHIPPED | OUTPATIENT
Start: 2018-12-04 | End: 2019-10-30

## 2018-12-26 DIAGNOSIS — K21.9 GASTROESOPHAGEAL REFLUX DISEASE WITHOUT ESOPHAGITIS: ICD-10-CM

## 2018-12-26 RX ORDER — OMEPRAZOLE 40 MG/1
CAPSULE, DELAYED RELEASE ORAL
Qty: 30 CAPSULE | Refills: 0 | Status: SHIPPED | OUTPATIENT
Start: 2018-12-26 | End: 2019-01-29 | Stop reason: SDUPTHER

## 2018-12-27 DIAGNOSIS — G40.909 SEIZURE DISORDER (HCC): ICD-10-CM

## 2018-12-28 RX ORDER — CARBAMAZEPINE 200 MG/1
TABLET ORAL
Qty: 60 TABLET | Refills: 2 | Status: SHIPPED | OUTPATIENT
Start: 2018-12-28 | End: 2019-03-19 | Stop reason: SDUPTHER

## 2019-01-03 RX ORDER — FLUOXETINE HYDROCHLORIDE 20 MG/1
20 CAPSULE ORAL DAILY
Qty: 30 CAPSULE | Refills: 2 | Status: SHIPPED | OUTPATIENT
Start: 2019-01-03 | End: 2019-03-06 | Stop reason: SDUPTHER

## 2019-01-29 DIAGNOSIS — K21.9 GASTROESOPHAGEAL REFLUX DISEASE WITHOUT ESOPHAGITIS: ICD-10-CM

## 2019-01-29 RX ORDER — OMEPRAZOLE 40 MG/1
CAPSULE, DELAYED RELEASE ORAL
Qty: 30 CAPSULE | Refills: 0 | Status: SHIPPED | OUTPATIENT
Start: 2019-01-29 | End: 2019-05-04 | Stop reason: SDUPTHER

## 2019-02-06 DIAGNOSIS — F41.8 DEPRESSION WITH ANXIETY: ICD-10-CM

## 2019-02-06 DIAGNOSIS — I10 ESSENTIAL HYPERTENSION: ICD-10-CM

## 2019-02-06 DIAGNOSIS — F51.04 PSYCHOPHYSIOLOGICAL INSOMNIA: ICD-10-CM

## 2019-02-06 RX ORDER — BUSPIRONE HYDROCHLORIDE 10 MG/1
TABLET ORAL
Qty: 90 TABLET | Refills: 0 | OUTPATIENT
Start: 2019-02-06

## 2019-02-06 RX ORDER — DOXEPIN HYDROCHLORIDE 10 MG/1
10 CAPSULE ORAL NIGHTLY PRN
Qty: 30 CAPSULE | Refills: 0 | OUTPATIENT
Start: 2019-02-06

## 2019-02-06 RX ORDER — LISINOPRIL 5 MG/1
TABLET ORAL
Qty: 30 TABLET | Refills: 0 | OUTPATIENT
Start: 2019-02-06

## 2019-02-11 RX ORDER — ALBUTEROL SULFATE 90 UG/1
2 AEROSOL, METERED RESPIRATORY (INHALATION) EVERY 6 HOURS PRN
Qty: 3 INHALER | Refills: 4 | Status: SHIPPED | OUTPATIENT
Start: 2019-02-11

## 2019-02-12 RX ORDER — ALBUTEROL SULFATE 90 UG/1
AEROSOL, METERED RESPIRATORY (INHALATION)
Qty: 18 G | Refills: 0 | OUTPATIENT
Start: 2019-02-12

## 2019-02-13 DIAGNOSIS — M25.512 ACUTE PAIN OF LEFT SHOULDER: ICD-10-CM

## 2019-02-13 RX ORDER — IBUPROFEN 800 MG/1
TABLET ORAL
Qty: 30 TABLET | Refills: 0 | Status: SHIPPED | OUTPATIENT
Start: 2019-02-13 | End: 2019-04-15 | Stop reason: SDUPTHER

## 2019-02-23 DIAGNOSIS — M25.512 ACUTE PAIN OF LEFT SHOULDER: ICD-10-CM

## 2019-02-25 RX ORDER — IBUPROFEN 800 MG/1
TABLET ORAL
Qty: 30 TABLET | Refills: 0 | OUTPATIENT
Start: 2019-02-25

## 2019-03-02 DIAGNOSIS — K21.9 GASTROESOPHAGEAL REFLUX DISEASE WITHOUT ESOPHAGITIS: ICD-10-CM

## 2019-03-04 RX ORDER — OMEPRAZOLE 40 MG/1
CAPSULE, DELAYED RELEASE ORAL
Qty: 30 CAPSULE | Refills: 0 | OUTPATIENT
Start: 2019-03-04

## 2019-03-06 RX ORDER — FLUOXETINE HYDROCHLORIDE 20 MG/1
20 CAPSULE ORAL DAILY
Qty: 30 CAPSULE | Refills: 2 | Status: SHIPPED | OUTPATIENT
Start: 2019-03-06 | End: 2019-05-21 | Stop reason: SDUPTHER

## 2019-03-11 DIAGNOSIS — J30.2 SEASONAL ALLERGIC RHINITIS, UNSPECIFIED TRIGGER: ICD-10-CM

## 2019-03-11 RX ORDER — FLUTICASONE PROPIONATE 50 MCG
SPRAY, SUSPENSION (ML) NASAL
Qty: 9.9 ML | Refills: 0 | Status: SHIPPED | OUTPATIENT
Start: 2019-03-11 | End: 2019-04-15 | Stop reason: SDUPTHER

## 2019-03-19 DIAGNOSIS — G40.909 SEIZURE DISORDER (HCC): ICD-10-CM

## 2019-03-20 RX ORDER — CARBAMAZEPINE 200 MG/1
TABLET ORAL
Qty: 60 TABLET | Refills: 0 | Status: SHIPPED | OUTPATIENT
Start: 2019-03-20 | End: 2019-04-23 | Stop reason: SDUPTHER

## 2019-03-25 DIAGNOSIS — J30.89 CHRONIC NON-SEASONAL ALLERGIC RHINITIS: ICD-10-CM

## 2019-03-25 RX ORDER — FEXOFENADINE HCL 180 MG/1
180 TABLET ORAL DAILY
Qty: 30 TABLET | Refills: 0 | Status: SHIPPED | OUTPATIENT
Start: 2019-03-25 | End: 2019-04-22 | Stop reason: SDUPTHER

## 2019-04-15 DIAGNOSIS — J30.2 SEASONAL ALLERGIC RHINITIS, UNSPECIFIED TRIGGER: ICD-10-CM

## 2019-04-15 DIAGNOSIS — M25.512 ACUTE PAIN OF LEFT SHOULDER: ICD-10-CM

## 2019-04-15 RX ORDER — FLUTICASONE PROPIONATE 50 MCG
SPRAY, SUSPENSION (ML) NASAL
Qty: 16 ML | Refills: 0 | Status: SHIPPED | OUTPATIENT
Start: 2019-04-15 | End: 2019-04-25 | Stop reason: SDUPTHER

## 2019-04-15 RX ORDER — IBUPROFEN 800 MG/1
TABLET ORAL
Qty: 30 TABLET | Refills: 0 | Status: SHIPPED | OUTPATIENT
Start: 2019-04-15 | End: 2019-04-22 | Stop reason: SDUPTHER

## 2019-04-22 DIAGNOSIS — M25.512 ACUTE PAIN OF LEFT SHOULDER: ICD-10-CM

## 2019-04-22 DIAGNOSIS — J30.89 CHRONIC NON-SEASONAL ALLERGIC RHINITIS: ICD-10-CM

## 2019-04-22 RX ORDER — IBUPROFEN 800 MG/1
TABLET ORAL
Qty: 30 TABLET | Refills: 0 | Status: SHIPPED | OUTPATIENT
Start: 2019-04-22 | End: 2019-05-21 | Stop reason: SDUPTHER

## 2019-04-23 DIAGNOSIS — G40.909 SEIZURE DISORDER (HCC): ICD-10-CM

## 2019-04-23 RX ORDER — FEXOFENADINE HCL 180 MG/1
180 TABLET ORAL DAILY
Qty: 30 TABLET | Refills: 0 | Status: SHIPPED | OUTPATIENT
Start: 2019-04-23

## 2019-04-23 RX ORDER — CARBAMAZEPINE 200 MG/1
TABLET ORAL
Qty: 60 TABLET | Refills: 0 | Status: SHIPPED | OUTPATIENT
Start: 2019-04-23 | End: 2019-05-22 | Stop reason: SDUPTHER

## 2019-04-25 DIAGNOSIS — J30.2 SEASONAL ALLERGIC RHINITIS, UNSPECIFIED TRIGGER: ICD-10-CM

## 2019-04-25 RX ORDER — FLUTICASONE PROPIONATE 50 MCG
SPRAY, SUSPENSION (ML) NASAL
Qty: 59.4 ML | Refills: 0 | Status: SHIPPED | OUTPATIENT
Start: 2019-04-25 | End: 2019-10-30

## 2019-05-02 DIAGNOSIS — M25.512 ACUTE PAIN OF LEFT SHOULDER: ICD-10-CM

## 2019-05-02 RX ORDER — IBUPROFEN 800 MG/1
TABLET ORAL
Qty: 30 TABLET | Refills: 0 | OUTPATIENT
Start: 2019-05-02

## 2019-05-04 DIAGNOSIS — K21.9 GASTROESOPHAGEAL REFLUX DISEASE WITHOUT ESOPHAGITIS: ICD-10-CM

## 2019-05-06 RX ORDER — OMEPRAZOLE 40 MG/1
CAPSULE, DELAYED RELEASE ORAL
Qty: 30 CAPSULE | Refills: 0 | Status: SHIPPED | OUTPATIENT
Start: 2019-05-06

## 2019-05-21 ENCOUNTER — OFFICE VISIT (OUTPATIENT)
Dept: INTERNAL MEDICINE | Facility: CLINIC | Age: 34
End: 2019-05-21

## 2019-05-21 VITALS
TEMPERATURE: 98.3 F | OXYGEN SATURATION: 97 % | DIASTOLIC BLOOD PRESSURE: 70 MMHG | HEART RATE: 81 BPM | WEIGHT: 199.8 LBS | BODY MASS INDEX: 27.06 KG/M2 | HEIGHT: 72 IN | SYSTOLIC BLOOD PRESSURE: 130 MMHG

## 2019-05-21 DIAGNOSIS — M25.531 RIGHT WRIST PAIN: Primary | ICD-10-CM

## 2019-05-21 DIAGNOSIS — F41.8 DEPRESSION WITH ANXIETY: ICD-10-CM

## 2019-05-21 PROCEDURE — 99213 OFFICE O/P EST LOW 20 MIN: CPT | Performed by: PHYSICIAN ASSISTANT

## 2019-05-21 RX ORDER — FLUOXETINE HYDROCHLORIDE 20 MG/1
40 CAPSULE ORAL DAILY
Qty: 30 CAPSULE | Refills: 0 | Status: SHIPPED | OUTPATIENT
Start: 2019-05-21 | End: 2019-10-30

## 2019-05-21 RX ORDER — IBUPROFEN 800 MG/1
800 TABLET ORAL EVERY 8 HOURS PRN
Qty: 30 TABLET | Refills: 0 | Status: SHIPPED | OUTPATIENT
Start: 2019-05-21

## 2019-05-21 RX ORDER — BUPRENORPHINE HYDROCHLORIDE AND NALOXONE HYDROCHLORIDE DIHYDRATE 8; 2 MG/1; MG/1
TABLET SUBLINGUAL
Refills: 0 | COMMUNITY
Start: 2019-05-16

## 2019-05-21 NOTE — PROGRESS NOTES
Chief Complaint   Patient presents with   • Annual Exam     right wrist pain,        Rafael Todd is a 34 y.o. male and is here for a comprehensive physical exam. The patient reports problems - right wrist pain. .    Do you take any herbs or supplements that were not prescribed by a doctor? {yes/no/not asked:5110}  Are you taking calcium supplements? {yes/no:707752}  Are you taking aspirin daily? {yes/no:444569}    Health Habits:  Dental Exam. {status:89103}  Eye Exam. {status:33872}  Exercise: {numbers; 0-10:22989} times/week.  Current exercise activities include: {misc; exercise types:46498}    Health Maintenance   Topic Date Due   • ANNUAL PHYSICAL  02/24/1986   • PNEUMOCOCCAL VACCINE (19-64 MEDIUM RISK) (1 of 1 - PPSV23) 02/24/2002   • TDAP/TD VACCINES (1 - Tdap) 02/24/2002   • INFLUENZA VACCINE  08/01/2019       PMH, PSH, SocHx, FamHx, Allergies, and Medications: Reviewed and updated in the Visit Navigator.     Allergies   Allergen Reactions   • Gabapentin Other (See Comments)     SEIZURES     • Keflex [Cephalexin] Nausea Only     Past Medical History:   Diagnosis Date   • Acid reflux    • Anxiety    • Depression    • Hepatitis C    • History of being tatooed    • Hypertension    • Insomnia    • Seizure disorder (CMS/HCC)     LAST SEIZURE FALL OF 2012     Past Surgical History:   Procedure Laterality Date   • WISDOM TOOTH EXTRACTION       Social History     Socioeconomic History   • Marital status:      Spouse name: Not on file   • Number of children: Not on file   • Years of education: Not on file   • Highest education level: Not on file   Tobacco Use   • Smoking status: Current Every Day Smoker     Packs/day: 1.00     Years: 21.00     Pack years: 21.00     Types: Cigarettes   • Smokeless tobacco: Current User     Types: Chew   Substance and Sexual Activity   • Alcohol use: Yes     Alcohol/week: 1.2 oz     Types: 2 Cans of beer per week     Frequency: Monthly or less     Drinks per session: 1 or 2      "Binge frequency: Less than monthly   • Drug use: Yes     Types: Heroin, Oxycodone     Comment: previous IV-quit 2015   • Sexual activity: Defer     Family History   Problem Relation Age of Onset   • Arthritis Mother    • Mental illness Mother    • Inflammatory bowel disease Mother    • Arthritis Father    • Diabetes Father    • Hypertension Father    • Mental illness Father    • Cancer Neg Hx    • Colon cancer Neg Hx        Review of Systems  Review of Systems   Constitutional: Negative for fatigue, unexpected weight gain and unexpected weight loss.   HENT: Negative.    Respiratory: Negative for cough, chest tightness and shortness of breath.    Cardiovascular: Negative for chest pain, palpitations and leg swelling.   Gastrointestinal: Negative for abdominal pain, nausea and vomiting.   Endocrine: Negative for polydipsia, polyphagia and polyuria.   Genitourinary: Negative for dysuria and hematuria.   Musculoskeletal: Positive for arthralgias (right wrist pain. ). Negative for joint swelling and myalgias.   Skin: Negative for color change and rash.   Neurological: Negative for dizziness, syncope, weakness and headache.   Psychiatric/Behavioral: Negative for behavioral problems, sleep disturbance and depressed mood.        Vitals:    05/21/19 1723   BP: 130/70   Pulse: 81   Temp: 98.3 °F (36.8 °C)   SpO2: 97%       Objective   /70 Comment: manual  Pulse 81   Temp 98.3 °F (36.8 °C) (Temporal)   Ht 182.9 cm (72\")   Wt 90.6 kg (199 lb 12.8 oz)   SpO2 97%   BMI 27.10 kg/m²     Physical Exam      Assessment/Plan   1. Healthy male exam. ***   2. Patient Counseling: Including but not Limited to the following, when appropriate:  --Nutrition: Stressed importance of moderation in sodium/caffeine intake, saturated fat and cholesterol, caloric balance, sufficient intake of fresh fruits, vegetables, fiber, calcium, iron, and 1 mg of folate supplement per day (for females capable of pregnancy).  --Discussed the issue of " estrogen replacement, calcium supplement, and the daily use of baby aspirin.  --Exercise: Stressed the importance of regular exercise.   --Substance Abuse: Discussed cessation/primary prevention of tobacco, alcohol, or other drug use; driving or other dangerous activities under the influence; availability of treatment for abuse, as indicated based on social history.    --Sexuality: Discussed sexually transmitted diseases, partner selection, use of condoms, avoidance of unintended pregnancy  and contraceptive alternatives.   --Injury prevention: Discussed safety belts, safety helmets, smoke detector, smoking near bedding or upholstery.   --Dental health: Discussed importance of regular tooth brushing, flossing, and dental visits.  --Immunizations reviewed.  --Discussed benefits of colon cancer screening.    3. Discussed the patient's BMI with him.  Patient's Body mass index is 27.1 kg/m². BMI is {BMI range:81944}.     4. No Follow-up on file.  5. Age-appropriate Screening Scheduled    There are no diagnoses linked to this encounter.     Zaria Suggs PA-C

## 2019-05-21 NOTE — PROGRESS NOTES
Chief Complaint   Patient presents with   • Wrist Pain       Subjective   Rafael Todd is a 34 y.o. male    History of Present Illness     Patient reports several weeks of right wrist pain.  He works as a  as well as other manual labor jobs.  He has previous medical history of acute injury to this wrist, where it was lacerated by a glass pane window.  This required surgical intervention.  He describes his current pain as a dull ache that worsens with use of the arm.  He has not been using ice or heat or other anti-inflammatories.  He denies any acute injury.    Past Medical History:   Diagnosis Date   • Acid reflux    • Anxiety    • Depression    • Hepatitis C    • History of being tatooed    • Hypertension    • Insomnia    • Seizure disorder (CMS/HCC)     LAST SEIZURE FALL OF 2012     Past Surgical History:   Procedure Laterality Date   • WISDOM TOOTH EXTRACTION       Family History   Problem Relation Age of Onset   • Arthritis Mother    • Mental illness Mother    • Inflammatory bowel disease Mother    • Arthritis Father    • Diabetes Father    • Hypertension Father    • Mental illness Father    • Cancer Neg Hx    • Colon cancer Neg Hx      Social History     Socioeconomic History   • Marital status:      Spouse name: Not on file   • Number of children: Not on file   • Years of education: Not on file   • Highest education level: Not on file   Tobacco Use   • Smoking status: Current Every Day Smoker     Packs/day: 1.00     Years: 21.00     Pack years: 21.00     Types: Cigarettes   • Smokeless tobacco: Current User     Types: Chew   Substance and Sexual Activity   • Alcohol use: Yes     Alcohol/week: 1.2 oz     Types: 2 Cans of beer per week     Frequency: Monthly or less     Drinks per session: 1 or 2     Binge frequency: Less than monthly   • Drug use: Yes     Types: Heroin, Oxycodone     Comment: previous IV-quit 2015   • Sexual activity: Defer     Allergies   Allergen Reactions   • Gabapentin Other  (See Comments)     SEIZURES     • Keflex [Cephalexin] Nausea Only         Review of Systems   Constitutional: Positive for activity change. Negative for appetite change, fatigue and fever.   Musculoskeletal: Positive for arthralgias. Negative for joint swelling and myalgias.   Skin: Negative for color change, skin lesions and bruise.         Objective     Vitals:    05/21/19 1723   BP: 130/70   Pulse: 81   Temp: 98.3 °F (36.8 °C)   SpO2: 97%       Physical Exam   Constitutional: He is oriented to person, place, and time. He appears well-developed and well-nourished. No distress.   HENT:   Head: Normocephalic and atraumatic.   Eyes: Conjunctivae and EOM are normal. Pupils are equal, round, and reactive to light.   Neck: Normal range of motion. Neck supple.   Cardiovascular: Normal rate, regular rhythm and normal heart sounds. Exam reveals no gallop and no friction rub.   No murmur heard.  Pulmonary/Chest: Effort normal and breath sounds normal. No respiratory distress. He has no wheezes. He has no rales.   Musculoskeletal: Normal range of motion. He exhibits tenderness. He exhibits no deformity.        Right wrist: He exhibits tenderness. He exhibits normal range of motion, no bony tenderness, no swelling, no crepitus and no deformity.   Large scar noted to the right wrist.  Range of motion normal.  No pinpoint pain.  Generalized tenderness noted no edema.   Neurological: He is alert and oriented to person, place, and time.   Skin: Skin is warm and dry. Capillary refill takes less than 2 seconds. He is not diaphoretic.   Psychiatric: He has a normal mood and affect. His behavior is normal.   Nursing note and vitals reviewed.    Assessment/Plan     Rafael was seen today for wrist pain.    Diagnoses and all orders for this visit:    Right wrist pain  -     Ice/heat, rest, ibuprofen as needed.    -     ibuprofen (ADVIL,MOTRIN) 800 MG tablet; Take 1 tablet by mouth Every 8 (Eight) Hours As Needed for Mild Pain  or  Moderate Pain .    Depression with anxiety  -     Patient reports that he has been taking 40 mg daily with good control of symptoms.  We will continue at this dosage.  Refill for 30 days.  -     FLUoxetine (PROzac) 20 MG capsule; Take 2 capsules by mouth Daily.    Discussed with patient his recent discharge from this practice and he has 8 days left in the 30 day notice to provide his care.  Advised him that he would need to establish care with another provider/practice.  Patient verbalized understanding.    No Follow-up on file.    Zaria Suggs PA-C

## 2019-05-22 DIAGNOSIS — G40.909 SEIZURE DISORDER (HCC): ICD-10-CM

## 2019-05-22 RX ORDER — CARBAMAZEPINE 200 MG/1
TABLET ORAL
Qty: 60 TABLET | Refills: 0 | Status: SHIPPED | OUTPATIENT
Start: 2019-05-22

## 2019-06-03 DIAGNOSIS — K21.9 GASTROESOPHAGEAL REFLUX DISEASE WITHOUT ESOPHAGITIS: ICD-10-CM

## 2019-06-03 RX ORDER — OMEPRAZOLE 40 MG/1
CAPSULE, DELAYED RELEASE ORAL
Qty: 30 CAPSULE | Refills: 0 | OUTPATIENT
Start: 2019-06-03

## 2019-06-19 DIAGNOSIS — G40.909 SEIZURE DISORDER (HCC): ICD-10-CM

## 2019-06-19 RX ORDER — CARBAMAZEPINE 200 MG/1
TABLET ORAL
Qty: 60 TABLET | Refills: 0 | OUTPATIENT
Start: 2019-06-19

## 2019-09-03 DIAGNOSIS — F41.8 DEPRESSION WITH ANXIETY: ICD-10-CM

## 2019-09-03 RX ORDER — FLUOXETINE HYDROCHLORIDE 20 MG/1
40 CAPSULE ORAL DAILY
Qty: 30 CAPSULE | Refills: 0 | OUTPATIENT
Start: 2019-09-03

## 2019-09-05 ENCOUNTER — LAB (OUTPATIENT)
Dept: LAB | Facility: HOSPITAL | Age: 34
End: 2019-09-05

## 2019-09-05 ENCOUNTER — TRANSCRIBE ORDERS (OUTPATIENT)
Dept: LAB | Facility: HOSPITAL | Age: 34
End: 2019-09-05

## 2019-09-05 DIAGNOSIS — R53.83 TIREDNESS: Primary | ICD-10-CM

## 2019-09-05 DIAGNOSIS — G40.89 OTHER SEIZURES (HCC): ICD-10-CM

## 2019-09-05 DIAGNOSIS — R53.83 TIREDNESS: ICD-10-CM

## 2019-09-05 LAB
ALBUMIN SERPL-MCNC: 4.8 G/DL (ref 3.5–5.2)
ALBUMIN/GLOB SERPL: 1.3 G/DL
ALP SERPL-CCNC: 84 U/L (ref 39–117)
ALT SERPL W P-5'-P-CCNC: 28 U/L (ref 1–41)
ANION GAP SERPL CALCULATED.3IONS-SCNC: 11.5 MMOL/L (ref 5–15)
AST SERPL-CCNC: 29 U/L (ref 1–40)
BASOPHILS # BLD AUTO: 0.04 10*3/MM3 (ref 0–0.2)
BASOPHILS NFR BLD AUTO: 0.7 % (ref 0–1.5)
BILIRUB SERPL-MCNC: 0.5 MG/DL (ref 0.2–1.2)
BUN BLD-MCNC: 9 MG/DL (ref 6–20)
BUN/CREAT SERPL: 10.8 (ref 7–25)
CALCIUM SPEC-SCNC: 9.6 MG/DL (ref 8.6–10.5)
CHLORIDE SERPL-SCNC: 98 MMOL/L (ref 98–107)
CHOLEST SERPL-MCNC: 176 MG/DL (ref 0–200)
CO2 SERPL-SCNC: 25.5 MMOL/L (ref 22–29)
CREAT BLD-MCNC: 0.83 MG/DL (ref 0.76–1.27)
DEPRECATED RDW RBC AUTO: 43.2 FL (ref 37–54)
EOSINOPHIL # BLD AUTO: 0.12 10*3/MM3 (ref 0–0.4)
EOSINOPHIL NFR BLD AUTO: 2 % (ref 0.3–6.2)
ERYTHROCYTE [DISTWIDTH] IN BLOOD BY AUTOMATED COUNT: 12.7 % (ref 12.3–15.4)
GFR SERPL CREATININE-BSD FRML MDRD: 105 ML/MIN/1.73
GLOBULIN UR ELPH-MCNC: 3.6 GM/DL
GLUCOSE BLD-MCNC: 82 MG/DL (ref 65–99)
HAV IGM SERPL QL IA: ABNORMAL
HBV CORE IGM SERPL QL IA: ABNORMAL
HBV SURFACE AG SERPL QL IA: ABNORMAL
HCT VFR BLD AUTO: 46.8 % (ref 37.5–51)
HCV AB SER DONR QL: REACTIVE
HDLC SERPL-MCNC: 34 MG/DL (ref 40–60)
HGB BLD-MCNC: 15.4 G/DL (ref 13–17.7)
IMM GRANULOCYTES # BLD AUTO: 0.01 10*3/MM3 (ref 0–0.05)
IMM GRANULOCYTES NFR BLD AUTO: 0.2 % (ref 0–0.5)
LDLC SERPL CALC-MCNC: 120 MG/DL (ref 0–100)
LDLC/HDLC SERPL: 3.52 {RATIO}
LYMPHOCYTES # BLD AUTO: 1.98 10*3/MM3 (ref 0.7–3.1)
LYMPHOCYTES NFR BLD AUTO: 32.9 % (ref 19.6–45.3)
MCH RBC QN AUTO: 30.4 PG (ref 26.6–33)
MCHC RBC AUTO-ENTMCNC: 32.9 G/DL (ref 31.5–35.7)
MCV RBC AUTO: 92.3 FL (ref 79–97)
MONOCYTES # BLD AUTO: 0.6 10*3/MM3 (ref 0.1–0.9)
MONOCYTES NFR BLD AUTO: 10 % (ref 5–12)
NEUTROPHILS # BLD AUTO: 3.26 10*3/MM3 (ref 1.7–7)
NEUTROPHILS NFR BLD AUTO: 54.2 % (ref 42.7–76)
NRBC BLD AUTO-RTO: 0.2 /100 WBC (ref 0–0.2)
PLATELET # BLD AUTO: 179 10*3/MM3 (ref 140–450)
PMV BLD AUTO: 11.2 FL (ref 6–12)
POTASSIUM BLD-SCNC: 3.9 MMOL/L (ref 3.5–5.2)
PROT SERPL-MCNC: 8.4 G/DL (ref 6–8.5)
RBC # BLD AUTO: 5.07 10*6/MM3 (ref 4.14–5.8)
SODIUM BLD-SCNC: 135 MMOL/L (ref 136–145)
TRIGL SERPL-MCNC: 112 MG/DL (ref 0–150)
TSH SERPL DL<=0.05 MIU/L-ACNC: 1.75 UIU/ML (ref 0.27–4.2)
VLDLC SERPL-MCNC: 22.4 MG/DL (ref 5–40)
WBC NRBC COR # BLD: 6.01 10*3/MM3 (ref 3.4–10.8)

## 2019-09-05 PROCEDURE — G0432 EIA HIV-1/HIV-2 SCREEN: HCPCS

## 2019-09-05 PROCEDURE — 80157 ASSAY CARBAMAZEPINE FREE: CPT

## 2019-09-05 PROCEDURE — 80156 ASSAY CARBAMAZEPINE TOTAL: CPT

## 2019-09-05 PROCEDURE — 84443 ASSAY THYROID STIM HORMONE: CPT

## 2019-09-05 PROCEDURE — 80074 ACUTE HEPATITIS PANEL: CPT

## 2019-09-05 PROCEDURE — 80061 LIPID PANEL: CPT

## 2019-09-05 PROCEDURE — 36415 COLL VENOUS BLD VENIPUNCTURE: CPT

## 2019-09-05 PROCEDURE — 86592 SYPHILIS TEST NON-TREP QUAL: CPT

## 2019-09-05 PROCEDURE — 82607 VITAMIN B-12: CPT

## 2019-09-05 PROCEDURE — 80053 COMPREHEN METABOLIC PANEL: CPT

## 2019-09-05 PROCEDURE — 85025 COMPLETE CBC W/AUTO DIFF WBC: CPT

## 2019-09-06 LAB
HIV1+2 AB SER QL: NORMAL
RPR SER QL: NORMAL
VIT B12 BLD-MCNC: 643 PG/ML (ref 211–946)

## 2019-09-09 LAB
CARBAMAZEPINE FREE SERPL-MCNC: 1.5 UG/ML (ref 0.6–4.2)
CARBAMAZEPINE SERPL-MCNC: 6.2 UG/ML (ref 4–12)

## 2019-10-30 ENCOUNTER — OFFICE VISIT (OUTPATIENT)
Dept: GASTROENTEROLOGY | Facility: CLINIC | Age: 34
End: 2019-10-30

## 2019-10-30 VITALS
HEART RATE: 124 BPM | RESPIRATION RATE: 18 BRPM | DIASTOLIC BLOOD PRESSURE: 77 MMHG | HEIGHT: 72 IN | SYSTOLIC BLOOD PRESSURE: 140 MMHG | BODY MASS INDEX: 26.95 KG/M2 | TEMPERATURE: 96.4 F | WEIGHT: 199 LBS

## 2019-10-30 DIAGNOSIS — R79.89 ABNORMAL LIVER FUNCTION TESTS: ICD-10-CM

## 2019-10-30 DIAGNOSIS — R10.31 RIGHT LOWER QUADRANT ABDOMINAL PAIN: Primary | ICD-10-CM

## 2019-10-30 DIAGNOSIS — K59.00 CONSTIPATION, UNSPECIFIED CONSTIPATION TYPE: ICD-10-CM

## 2019-10-30 DIAGNOSIS — B18.2 CHRONIC HEPATITIS C WITHOUT HEPATIC COMA (HCC): ICD-10-CM

## 2019-10-30 DIAGNOSIS — R11.0 NAUSEA: ICD-10-CM

## 2019-10-30 DIAGNOSIS — R12 HEARTBURN: ICD-10-CM

## 2019-10-30 PROCEDURE — 99204 OFFICE O/P NEW MOD 45 MIN: CPT | Performed by: INTERNAL MEDICINE

## 2019-10-30 RX ORDER — FLUOXETINE HYDROCHLORIDE 40 MG/1
40 CAPSULE ORAL NIGHTLY
COMMUNITY

## 2019-10-30 NOTE — PATIENT INSTRUCTIONS
"1. Anti-reflex measures.  Of interest that the patient drinks about 24 cups of coffee in the morning.    2. The patient has been advised to cut down on coffee.  3. The patient has been advised to avoid alcohol altogether.  4. The patient should be evaluated for issue of continued use of Tegretol.  5. Issue of \"Tylenol warning\" was discussed in detail.  Tylenol remains a safe medication in this patient.  However certain precautions needs to be taken including the patient should limit the Tylenol to 500 and it to every 6 hours if needed.  Furthermore consideration should be given regarding other medications that may potentially containing Tylenol-acetaminophen such as NyQuil etc.  6. The patient was counseled for smoking cessation (Smoking cessation counseling/symptomatic/75000/3 minutes).  7. Prilosec(Omeprazole) DR capsule 40 mg. Take one capsule in the morning half an hour before eating daily.  8. The patient was counseled regarding hepatitis C. This included the nature of the illness, mode of transmission, risk factors, natural history, aggravating factors, complications, precautions, and treatment options in detail.  9. Noninvasive liver work-up.  10. Colonoscopy: Description of the procedure, risks benefits alternatives and options including non-operative options were discussed with the patient in detail.  The patient understands and wishes to proceed.  11. Discussed with the patient in detail.  Multiple questions were answered.  Opportunity was given for additional questions.    "

## 2019-10-30 NOTE — PROGRESS NOTES
"Chief Complaint   Patient presents with   • Abdominal Pain     History of Present Illness    There is history of RLQ abdominal pain off and on for the last 2 months .  The pain is gradual in onset, mild in severity and aching in character.  Frequency being several times a week.  The pain may last for several minutes.  The pain may radiate towards suprapubic area.  Standing and walking worsens the abdominal pain rest and lying down improves the pain.  There is no history of fever or chills.    The patient has new onset change in bowel habits described as constipation for the last 3 months.  Constipation is rather severe. This is described as hard stools perhaps one bowel movement once a week. The patient takes frequent laxatives to have a bowel movement. There is no associated rectal pain.  Prior to the change in bowel habits the patient was regular.    The patient has history of recurrent nausea for the last 2 months.  The nausea is described as mild, frequency being a couple of times a week.  Nauseous feeling may last for several minutes.  Eating worsens the symptom however no definite relieving factors of nausea.  There is no associated vomiting.    The patient has a history of reflux off and on for the last several years.  The reflux is moderately severe.  Symptoms are described as retrosternal burning sensation, and indigestion.  There is history of occasional regurgitative symptoms.  Frequency being several times per week.  The symptoms are worse at night.  The patient takes acid suppressive therapy with reasonable control of his symptoms.  There is no associated dysphagia or odynophagia.    The patient has history of abnormal liver function tests.  He was told to have \"hepatitis C\" in the fall 2016.  The patient has history of IVDA.  He started using drugs during his teen years.  The last use of drugs was in January 2018.  The patient was injecting \"pain pills\".  The patient claims that he has been tested for " "HIV about a month ago and was negative.  The patient has been on Suboxone for the last 1 year.  The patient has not been evaluated or treated for hepatitis C.    The patient has history of anxiety and depression.  Depression is currently under control.  There is no history of suicidal ideation in the past or currently.  Patient had an episode of seizure in 2012.  According to the patient it was thought to be related to the drug.  The patient claims that he was diagnosed to have \"bipolar disorder\" about couple of years ago.  There is no first-degree relatives with colon cancer.  The patient had an uncle who had colon cancer.  There is no family history of celiac disease, inflammatory bowel disease or chronic liver disease.    There is no history of jaundice, darkening of urine color, lightening of stool color or pruritus.    Currently, the patient is a smoker.  There is history of significant alcohol use.  He started drinking at age 12.  The patient had drunk about case of beer almost on daily basis for about 10 years (age [age]).  At age 23 the patient cut down on alcohol.  However he continues to drink about 6 packs of beer every other month or so.  His last alcohol intake was 2 weeks ago.  The patient denies using marijuana.    The patient is .  Unfortunately, his wife is disabled due to lupus and rheumatoid arthritis.  Recently she had undergone hip replacement.  The patient has 2 kids ages 15 and 18.  The patient works as a .    Review of Systems   Constitutional: Positive for fatigue. Negative for appetite change, chills, fever and unexpected weight change.   HENT: Negative for mouth sores, nosebleeds and trouble swallowing.    Eyes: Positive for visual disturbance. Negative for discharge and redness.        The patient feels blurry at times.   Respiratory: Negative for apnea, cough and shortness of breath.    Cardiovascular: Positive for leg swelling. Negative for chest pain and palpitations. "   Gastrointestinal: Positive for abdominal pain, constipation and nausea. Negative for abdominal distention, anal bleeding, blood in stool, diarrhea and vomiting.   Endocrine: Negative for cold intolerance, heat intolerance and polydipsia.   Genitourinary: Negative for dysuria, hematuria and urgency.   Musculoskeletal: Positive for arthralgias, back pain, myalgias, neck pain and neck stiffness. Negative for joint swelling.   Skin: Negative for rash.   Allergic/Immunologic: Positive for environmental allergies. Negative for food allergies and immunocompromised state.   Neurological: Positive for dizziness and seizures. Negative for syncope and headaches.   Hematological: Negative for adenopathy. Does not bruise/bleed easily.   Psychiatric/Behavioral: Negative for dysphoric mood. The patient is nervous/anxious. The patient is not hyperactive.      Patient Active Problem List   Diagnosis   • Chronic hepatitis C without hepatic coma (CMS/HCC)   • Depression with anxiety   • Seizure disorder (CMS/HCC)   • Essential hypertension   • Gastroesophageal reflux disease without esophagitis   • Epigastric pain   • Heartburn   • Nausea   • Hepatitis C virus infection without hepatic coma     Past Medical History:   Diagnosis Date   • Acid reflux    • Anxiety    • Back pain    • Bipolar disorder (CMS/HCC)    • Depression 1999   • Drug-induced seizure (CMS/HCC)     2006 first, last 10/2012   • Hepatitis C    • Hepatitis C    • History of being tatooed    • Hypertension    • Insomnia    • Kidney stone 2012   • Seasonal allergies    • Seizure disorder (CMS/HCC)     LAST SEIZURE FALL OF 2012   • Tattoos     5     Past Surgical History:   Procedure Laterality Date   • WISDOM TOOTH EXTRACTION       Family History   Problem Relation Age of Onset   • Arthritis Mother    • Mental illness Mother    • Inflammatory bowel disease Mother    • Arthritis Father    • Diabetes Father    • Hypertension Father    • Mental illness Father    • Colon  "cancer Maternal Uncle    • Cancer Neg Hx      Social History     Tobacco Use   • Smoking status: Current Every Day Smoker     Packs/day: 1.00     Years: 21.00     Pack years: 21.00     Types: Cigarettes   • Smokeless tobacco: Current User     Types: Chew   Substance Use Topics   • Alcohol use: Yes     Alcohol/week: 3.6 oz     Types: 6 Cans of beer per week     Frequency: Monthly or less       Current Outpatient Medications:   •  albuterol sulfate HFA (VENTOLIN HFA) 108 (90 Base) MCG/ACT inhaler, Inhale 2 puffs Every 6 (Six) Hours As Needed for Wheezing or Shortness of Air., Disp: 3 inhaler, Rfl: 4  •  buprenorphine-naloxone (SUBOXONE) 8-2 MG per SL tablet, DIS 1.25 TS UNT QD, Disp: , Rfl: 0  •  carBAMazepine (TEGretol) 200 MG tablet, TAKE 1 TABLET BY MOUTH TWICE DAILY, Disp: 60 tablet, Rfl: 0  •  fexofenadine (ALLEGRA) 180 MG tablet, TAKE 1 TABLET BY MOUTH DAILY, Disp: 30 tablet, Rfl: 0  •  FLUoxetine (PROzac) 40 MG capsule, Take 40 mg by mouth Every Night., Disp: , Rfl:   •  ibuprofen (ADVIL,MOTRIN) 800 MG tablet, Take 1 tablet by mouth Every 8 (Eight) Hours As Needed for Mild Pain  or Moderate Pain ., Disp: 30 tablet, Rfl: 0  •  Multiple Vitamins-Minerals (MULTI ADULT GUMMIES PO), , Disp: , Rfl:   •  omeprazole (priLOSEC) 40 MG capsule, TAKE 1 CAPSULE BY MOUTH DAILY, Disp: 30 capsule, Rfl: 0    Allergies   Allergen Reactions   • Gabapentin Other (See Comments)     SEIZURES     • Keflex [Cephalexin] Nausea Only       Blood pressure 140/77, pulse (!) 124, temperature 96.4 °F (35.8 °C), resp. rate 18, height 182.9 cm (72\"), weight 90.3 kg (199 lb).    Physical Exam   Constitutional: He is oriented to person, place, and time. He appears well-developed and well-nourished. No distress.   HENT:   Head: Normocephalic and atraumatic.   Right Ear: Hearing and external ear normal.   Left Ear: Hearing and external ear normal.   Nose: Nose normal.   Mouth/Throat: Oropharynx is clear and moist and mucous membranes are normal. " Mucous membranes are not pale, not dry and not cyanotic. No oral lesions. No oropharyngeal exudate.   Eyes: Conjunctivae and EOM are normal. Right eye exhibits no discharge. Left eye exhibits no discharge. No scleral icterus.   Neck: Trachea normal. Neck supple. No JVD present. No edema present. No thyroid mass and no thyromegaly present.   Cardiovascular: Normal rate, regular rhythm, S2 normal and normal heart sounds. Exam reveals no gallop, no S3 and no friction rub.   No murmur heard.  Pulmonary/Chest: Effort normal and breath sounds normal. No respiratory distress. He has no wheezes. He has no rales. He exhibits no tenderness.   Abdominal: Soft. Normal appearance and bowel sounds are normal. He exhibits no distension, no ascites and no mass. There is no splenomegaly or hepatomegaly. There is no tenderness. There is no rigidity, no rebound and no guarding. No hernia.   Musculoskeletal: He exhibits no tenderness or deformity.     Vascular Status -  His right foot exhibits no edema. His left foot exhibits no edema.  Lymphadenopathy:     He has no cervical adenopathy.        Left: No supraclavicular adenopathy present.   Neurological: He is alert and oriented to person, place, and time. He has normal strength. No cranial nerve deficit or sensory deficit. He exhibits normal muscle tone. Coordination normal.   Skin: No rash noted. He is not diaphoretic. No cyanosis. No pallor. Nails show no clubbing.   Psychiatric: He has a normal mood and affect. His behavior is normal. Judgment and thought content normal.   Nursing note and vitals reviewed.  Stigmata of chronic liver disease:  None.  Asterixis:  None.    Laboratory Testing:  Upon review of medical records:    Dated October 24, 2017 sodium 137 potassium 4.6 chloride 101 CO2 25 BUN 16 serum creatinine 0.80 glucose 104.  Calcium 9.3.  Total protein 7.9.  Albumin 4.40.  T bili 0.5 AST elevated at 59 ALT elevated at 70 alkaline phosphatase 79.  C-reactive protein  "elevated at 3.70.  WBC 8.69 hemoglobin 12.9 hematocrit 36.8 MCV 88.9 and platelet count 131.    Dated September 5, 2019 sodium 135 potassium 3.9 chloride 98 CO2 25.5 BUN 9 serum creatinine 0.83 glucose 82.  Calcium 9.6.  Total protein 8.4.  Albumin 4.80.  T bili 0.5 AST 29 ALT 28 alkaline phosphatase 84.  Total cholesterol 176.  Triglycerides 112.  TSH 1.750.  Vitamin B12 level 643.  WBC 6.01 hemoglobin 15.4 hematocrit 46.8 MCV 92.3 and platelet count 179.   Hepatitis A IgM nonreactive.  Hepatitis B core IgM nonreactive.  Hepatitis B surface antigen nonreactive.  Hepatitis C antibody reactive.  HIV 1/HIV-2 nonreactive.  RPR nonreactive.    Abdominal Imaging:   Upon review of medical records:    Dated October 24, 2017 the patient underwent a CT the abdomen and pelvis with contrast which revealed: Bilateral lower lobe atelectasis.  Liver, spleen, pancreas, adrenal glands and left kidney are unremarkable.  A small right renal cyst.  Bowel gas pattern is nonobstructive.  No wall thickening or mesenteric inflammatory stranding.  Appendix appears normal.  Bladder is normal in contour.  No free fluid, free air or adenopathy.    Assessment:      ICD-10-CM ICD-9-CM   1. Right lower quadrant abdominal pain R10.31 789.03   2. Constipation, unspecified constipation type K59.00 564.00   3. Chronic hepatitis C without hepatic coma (CMS/HCC) B18.2 070.54   4. Abnormal liver function tests R94.5 790.6   5. Nausea R11.0 787.02   6. Heartburn R12 787.1         Discussion:  1.     Plan/  Patient Instructions   1. Anti-reflex measures.  Of interest that the patient drinks about 24 cups of coffee in the morning.    2. The patient has been advised to cut down on coffee.  3. The patient has been advised to avoid alcohol altogether.  4. The patient should be evaluated for issue of continued use of Tegretol.  5. Issue of \"Tylenol warning\" was discussed in detail.  Tylenol remains a safe medication in this patient.  However certain " precautions needs to be taken including the patient should limit the Tylenol to 500 and it to every 6 hours if needed.  Furthermore consideration should be given regarding other medications that may potentially containing Tylenol-acetaminophen such as NyQuil etc.  6. The patient was counseled for smoking cessation (Smoking cessation counseling/symptomatic/75463/3 minutes).  7. Prilosec(Omeprazole) DR capsule 40 mg. Take one capsule in the morning half an hour before eating daily.  8. The patient was counseled regarding hepatitis C. This included the nature of the illness, mode of transmission, risk factors, natural history, aggravating factors, complications, precautions, and treatment options in detail.  9. Noninvasive liver work-up.  10. Colonoscopy: Description of the procedure, risks benefits alternatives and options including non-operative options were discussed with the patient in detail.  The patient understands and wishes to proceed.  11. Discussed with the patient in detail.  Multiple questions were answered.  Opportunity was given for additional questions.         Blair Carlisle MD

## 2019-10-31 ENCOUNTER — TRANSCRIBE ORDERS (OUTPATIENT)
Dept: GENERAL RADIOLOGY | Facility: HOSPITAL | Age: 34
End: 2019-10-31

## 2019-10-31 ENCOUNTER — HOSPITAL ENCOUNTER (OUTPATIENT)
Dept: GENERAL RADIOLOGY | Facility: HOSPITAL | Age: 34
Discharge: HOME | End: 2019-10-31
Admitting: NURSE PRACTITIONER

## 2019-10-31 DIAGNOSIS — M54.2 CERVICAL SPINE PAIN: ICD-10-CM

## 2019-10-31 DIAGNOSIS — M54.50 LUMBAR PAIN: Primary | ICD-10-CM

## 2019-10-31 DIAGNOSIS — M54.6 THORACIC SPINE PAIN: ICD-10-CM

## 2019-10-31 DIAGNOSIS — M54.50 LUMBAR PAIN: ICD-10-CM

## 2019-10-31 PROCEDURE — 72050 X-RAY EXAM NECK SPINE 4/5VWS: CPT

## 2019-10-31 PROCEDURE — 72110 X-RAY EXAM L-2 SPINE 4/>VWS: CPT

## 2019-10-31 PROCEDURE — 72070 X-RAY EXAM THORAC SPINE 2VWS: CPT

## 2019-11-06 ENCOUNTER — RESULTS ENCOUNTER (OUTPATIENT)
Dept: GASTROENTEROLOGY | Facility: CLINIC | Age: 34
End: 2019-11-06

## 2019-11-06 DIAGNOSIS — B18.2 CHRONIC HEPATITIS C WITHOUT HEPATIC COMA (HCC): ICD-10-CM

## 2019-11-06 DIAGNOSIS — R79.89 ABNORMAL LIVER FUNCTION TESTS: ICD-10-CM

## 2019-11-07 ENCOUNTER — PREP FOR SURGERY (OUTPATIENT)
Dept: OTHER | Facility: HOSPITAL | Age: 34
End: 2019-11-07

## 2019-11-07 DIAGNOSIS — K59.00 CONSTIPATION, UNSPECIFIED CONSTIPATION TYPE: ICD-10-CM

## 2019-11-07 DIAGNOSIS — R19.4 CHANGE IN BOWEL HABITS: ICD-10-CM

## 2019-11-07 DIAGNOSIS — R10.31 RIGHT LOWER QUADRANT ABDOMINAL PAIN: Primary | ICD-10-CM

## 2019-11-07 DIAGNOSIS — Z12.11 COLON CANCER SCREENING: ICD-10-CM

## 2019-11-07 DIAGNOSIS — Z80.0 FAMILY HISTORY OF COLON CANCER: ICD-10-CM

## 2019-11-07 RX ORDER — SODIUM CHLORIDE 9 MG/ML
70 INJECTION, SOLUTION INTRAVENOUS CONTINUOUS PRN
Status: CANCELLED | OUTPATIENT
Start: 2019-11-22

## 2019-11-13 PROBLEM — Z12.11 COLON CANCER SCREENING: Status: ACTIVE | Noted: 2019-11-13

## 2019-11-13 PROBLEM — K59.00 CONSTIPATION: Status: ACTIVE | Noted: 2019-11-13

## 2019-11-13 PROBLEM — R10.31 RIGHT LOWER QUADRANT ABDOMINAL PAIN: Status: ACTIVE | Noted: 2019-11-13

## 2019-11-13 PROBLEM — Z80.0 FAMILY HISTORY OF COLON CANCER: Status: ACTIVE | Noted: 2019-11-13

## 2019-11-13 PROBLEM — R19.4 CHANGE IN BOWEL HABITS: Status: ACTIVE | Noted: 2019-11-13

## 2019-12-23 ENCOUNTER — TELEPHONE (OUTPATIENT)
Dept: GASTROENTEROLOGY | Facility: CLINIC | Age: 34
End: 2019-12-23

## 2020-01-31 ENCOUNTER — TELEPHONE (OUTPATIENT)
Dept: GASTROENTEROLOGY | Facility: CLINIC | Age: 35
End: 2020-01-31

## 2020-01-31 NOTE — TELEPHONE ENCOUNTER
2nd attempt: called patient regarding overdue lab results. [facility] for return call. Letter sent.

## 2020-02-17 ENCOUNTER — TELEPHONE (OUTPATIENT)
Dept: GASTROENTEROLOGY | Facility: CLINIC | Age: 35
End: 2020-02-17

## 2020-02-24 RX ORDER — ALBUTEROL SULFATE 90 UG/1
AEROSOL, METERED RESPIRATORY (INHALATION)
Qty: 18 INHALER | Refills: 0 | OUTPATIENT
Start: 2020-02-24

## 2020-03-28 RX ORDER — ALBUTEROL SULFATE 90 UG/1
AEROSOL, METERED RESPIRATORY (INHALATION)
Qty: 18 INHALER | Refills: 0 | OUTPATIENT
Start: 2020-03-28

## 2020-04-17 RX ORDER — ALBUTEROL SULFATE 90 UG/1
AEROSOL, METERED RESPIRATORY (INHALATION)
Qty: 18 INHALER | Refills: 0 | OUTPATIENT
Start: 2020-04-17

## 2020-05-07 RX ORDER — ALBUTEROL SULFATE 90 UG/1
AEROSOL, METERED RESPIRATORY (INHALATION)
Qty: 18 INHALER | Refills: 0 | OUTPATIENT
Start: 2020-05-07

## 2020-05-20 ENCOUNTER — LAB (OUTPATIENT)
Dept: LAB | Facility: HOSPITAL | Age: 35
End: 2020-05-20

## 2020-05-20 ENCOUNTER — TRANSCRIBE ORDERS (OUTPATIENT)
Dept: LAB | Facility: HOSPITAL | Age: 35
End: 2020-05-20

## 2020-05-20 ENCOUNTER — HOSPITAL ENCOUNTER (OUTPATIENT)
Dept: CT IMAGING | Facility: HOSPITAL | Age: 35
Discharge: HOME | End: 2020-05-20
Admitting: NURSE PRACTITIONER

## 2020-05-20 ENCOUNTER — TRANSCRIBE ORDERS (OUTPATIENT)
Dept: CT IMAGING | Facility: HOSPITAL | Age: 35
End: 2020-05-20

## 2020-05-20 DIAGNOSIS — R31.9 HEMATURIA, UNSPECIFIED TYPE: ICD-10-CM

## 2020-05-20 DIAGNOSIS — R53.83 TIREDNESS: ICD-10-CM

## 2020-05-20 DIAGNOSIS — R31.9 HEMATURIA, UNSPECIFIED TYPE: Primary | ICD-10-CM

## 2020-05-20 DIAGNOSIS — R53.83 TIREDNESS: Primary | ICD-10-CM

## 2020-05-20 DIAGNOSIS — N39.8: ICD-10-CM

## 2020-05-20 DIAGNOSIS — R10.9 FLANK PAIN: ICD-10-CM

## 2020-05-20 LAB
ALBUMIN SERPL-MCNC: 4.7 G/DL (ref 3.5–5.2)
ALBUMIN/GLOB SERPL: 1.3 G/DL
ALP SERPL-CCNC: 72 U/L (ref 39–117)
ALT SERPL W P-5'-P-CCNC: 69 U/L (ref 1–41)
ANION GAP SERPL CALCULATED.3IONS-SCNC: 8.9 MMOL/L (ref 5–15)
AST SERPL-CCNC: 57 U/L (ref 1–40)
BILIRUB SERPL-MCNC: 0.3 MG/DL (ref 0.2–1.2)
BUN BLD-MCNC: 20 MG/DL (ref 6–20)
BUN/CREAT SERPL: 23.8 (ref 7–25)
CALCIUM SPEC-SCNC: 9.1 MG/DL (ref 8.6–10.5)
CHLORIDE SERPL-SCNC: 101 MMOL/L (ref 98–107)
CO2 SERPL-SCNC: 28.1 MMOL/L (ref 22–29)
CREAT BLD-MCNC: 0.84 MG/DL (ref 0.76–1.27)
DEPRECATED RDW RBC AUTO: 46 FL (ref 37–54)
ERYTHROCYTE [DISTWIDTH] IN BLOOD BY AUTOMATED COUNT: 13.9 % (ref 12.3–15.4)
GFR SERPL CREATININE-BSD FRML MDRD: 103 ML/MIN/1.73
GLOBULIN UR ELPH-MCNC: 3.5 GM/DL
GLUCOSE BLD-MCNC: 86 MG/DL (ref 65–99)
HCT VFR BLD AUTO: 44.5 % (ref 37.5–51)
HGB BLD-MCNC: 15.9 G/DL (ref 13–17.7)
MCH RBC QN AUTO: 32.9 PG (ref 26.6–33)
MCHC RBC AUTO-ENTMCNC: 35.7 G/DL (ref 31.5–35.7)
MCV RBC AUTO: 92.1 FL (ref 79–97)
PLATELET # BLD AUTO: 161 10*3/MM3 (ref 140–450)
PMV BLD AUTO: 11.7 FL (ref 6–12)
POTASSIUM BLD-SCNC: 4.8 MMOL/L (ref 3.5–5.2)
PROT SERPL-MCNC: 8.2 G/DL (ref 6–8.5)
PSA SERPL-MCNC: 0.77 NG/ML (ref 0–4)
RBC # BLD AUTO: 4.83 10*6/MM3 (ref 4.14–5.8)
SODIUM BLD-SCNC: 138 MMOL/L (ref 136–145)
TSH SERPL DL<=0.05 MIU/L-ACNC: 1.81 UIU/ML (ref 0.27–4.2)
VIT B12 BLD-MCNC: 987 PG/ML (ref 211–946)
WBC NRBC COR # BLD: 7.64 10*3/MM3 (ref 3.4–10.8)

## 2020-05-20 PROCEDURE — G0103 PSA SCREENING: HCPCS

## 2020-05-20 PROCEDURE — 85027 COMPLETE CBC AUTOMATED: CPT

## 2020-05-20 PROCEDURE — 82607 VITAMIN B-12: CPT

## 2020-05-20 PROCEDURE — 36415 COLL VENOUS BLD VENIPUNCTURE: CPT

## 2020-05-20 PROCEDURE — 80053 COMPREHEN METABOLIC PANEL: CPT

## 2020-05-20 PROCEDURE — 84443 ASSAY THYROID STIM HORMONE: CPT

## 2020-05-20 PROCEDURE — 74176 CT ABD & PELVIS W/O CONTRAST: CPT

## 2020-12-28 ENCOUNTER — APPOINTMENT (OUTPATIENT)
Dept: GENERAL RADIOLOGY | Facility: HOSPITAL | Age: 35
End: 2020-12-28

## 2020-12-28 ENCOUNTER — HOSPITAL ENCOUNTER (EMERGENCY)
Facility: HOSPITAL | Age: 35
Discharge: LEFT BEFORE DISCHARGE | End: 2020-12-28
Attending: EMERGENCY MEDICINE | Admitting: EMERGENCY MEDICINE

## 2020-12-28 VITALS
WEIGHT: 203 LBS | OXYGEN SATURATION: 100 % | SYSTOLIC BLOOD PRESSURE: 117 MMHG | HEART RATE: 78 BPM | HEIGHT: 73 IN | TEMPERATURE: 98.3 F | DIASTOLIC BLOOD PRESSURE: 76 MMHG | RESPIRATION RATE: 22 BRPM | BODY MASS INDEX: 26.9 KG/M2

## 2020-12-28 DIAGNOSIS — R07.9 CHEST PAIN, UNSPECIFIED TYPE: Primary | ICD-10-CM

## 2020-12-28 LAB
ALBUMIN SERPL-MCNC: 4.3 G/DL (ref 3.5–5.2)
ALBUMIN/GLOB SERPL: 1.1 G/DL
ALP SERPL-CCNC: 99 U/L (ref 39–117)
ALT SERPL W P-5'-P-CCNC: 36 U/L (ref 1–41)
ANION GAP SERPL CALCULATED.3IONS-SCNC: 12.9 MMOL/L (ref 5–15)
AST SERPL-CCNC: 34 U/L (ref 1–40)
BASOPHILS # BLD AUTO: 0.03 10*3/MM3 (ref 0–0.2)
BASOPHILS NFR BLD AUTO: 0.5 % (ref 0–1.5)
BILIRUB SERPL-MCNC: 0.6 MG/DL (ref 0–1.2)
BUN SERPL-MCNC: 13 MG/DL (ref 6–20)
BUN/CREAT SERPL: 14.8 (ref 7–25)
CALCIUM SPEC-SCNC: 9.7 MG/DL (ref 8.6–10.5)
CHLORIDE SERPL-SCNC: 98 MMOL/L (ref 98–107)
CO2 SERPL-SCNC: 24.1 MMOL/L (ref 22–29)
CREAT SERPL-MCNC: 0.88 MG/DL (ref 0.76–1.27)
DEPRECATED RDW RBC AUTO: 42.4 FL (ref 37–54)
EOSINOPHIL # BLD AUTO: 0.05 10*3/MM3 (ref 0–0.4)
EOSINOPHIL NFR BLD AUTO: 0.8 % (ref 0.3–6.2)
ERYTHROCYTE [DISTWIDTH] IN BLOOD BY AUTOMATED COUNT: 12.7 % (ref 12.3–15.4)
GFR SERPL CREATININE-BSD FRML MDRD: 97 ML/MIN/1.73
GLOBULIN UR ELPH-MCNC: 3.9 GM/DL
GLUCOSE SERPL-MCNC: 106 MG/DL (ref 65–99)
HCT VFR BLD AUTO: 45.5 % (ref 37.5–51)
HGB BLD-MCNC: 15.8 G/DL (ref 13–17.7)
HOLD SPECIMEN: NORMAL
IMM GRANULOCYTES # BLD AUTO: 0.11 10*3/MM3 (ref 0–0.05)
IMM GRANULOCYTES NFR BLD AUTO: 1.9 % (ref 0–0.5)
LYMPHOCYTES # BLD AUTO: 0.5 10*3/MM3 (ref 0.7–3.1)
LYMPHOCYTES NFR BLD AUTO: 8.4 % (ref 19.6–45.3)
MCH RBC QN AUTO: 31.6 PG (ref 26.6–33)
MCHC RBC AUTO-ENTMCNC: 34.7 G/DL (ref 31.5–35.7)
MCV RBC AUTO: 91 FL (ref 79–97)
MONOCYTES # BLD AUTO: 0.04 10*3/MM3 (ref 0.1–0.9)
MONOCYTES NFR BLD AUTO: 0.7 % (ref 5–12)
NEUTROPHILS NFR BLD AUTO: 5.19 10*3/MM3 (ref 1.7–7)
NEUTROPHILS NFR BLD AUTO: 87.7 % (ref 42.7–76)
NRBC BLD AUTO-RTO: 0 /100 WBC (ref 0–0.2)
PLATELET # BLD AUTO: 120 10*3/MM3 (ref 140–450)
PMV BLD AUTO: 10.3 FL (ref 6–12)
POTASSIUM SERPL-SCNC: 4 MMOL/L (ref 3.5–5.2)
PROT SERPL-MCNC: 8.2 G/DL (ref 6–8.5)
RBC # BLD AUTO: 5 10*6/MM3 (ref 4.14–5.8)
SODIUM SERPL-SCNC: 135 MMOL/L (ref 136–145)
TROPONIN T SERPL-MCNC: <0.01 NG/ML (ref 0–0.03)
WBC # BLD AUTO: 5.92 10*3/MM3 (ref 3.4–10.8)
WHOLE BLOOD HOLD SPECIMEN: NORMAL
WHOLE BLOOD HOLD SPECIMEN: NORMAL

## 2020-12-28 PROCEDURE — 80053 COMPREHEN METABOLIC PANEL: CPT | Performed by: EMERGENCY MEDICINE

## 2020-12-28 PROCEDURE — 85025 COMPLETE CBC W/AUTO DIFF WBC: CPT | Performed by: EMERGENCY MEDICINE

## 2020-12-28 PROCEDURE — 99283 EMERGENCY DEPT VISIT LOW MDM: CPT

## 2020-12-28 PROCEDURE — 93005 ELECTROCARDIOGRAM TRACING: CPT | Performed by: EMERGENCY MEDICINE

## 2020-12-28 PROCEDURE — 84484 ASSAY OF TROPONIN QUANT: CPT | Performed by: EMERGENCY MEDICINE

## 2020-12-28 PROCEDURE — 71045 X-RAY EXAM CHEST 1 VIEW: CPT

## 2020-12-28 PROCEDURE — 93005 ELECTROCARDIOGRAM TRACING: CPT

## 2020-12-28 RX ORDER — SODIUM CHLORIDE 0.9 % (FLUSH) 0.9 %
10 SYRINGE (ML) INJECTION AS NEEDED
Status: DISCONTINUED | OUTPATIENT
Start: 2020-12-28 | End: 2020-12-28 | Stop reason: HOSPADM

## 2020-12-28 NOTE — ED PROVIDER NOTES
Subjective   35-year-old male presents to the ED with a chief complaint of chest pain.  Patient states that he was on his way to work when he had sudden onset pain in his bilateral legs that he described as crampy and achy pain in his upper thighs.  The pain then radiated into his low back and from there the pain radiated up into his chest.  He states that the pain was severe but he took some ibuprofen and it has helped slightly.  He also notes that he was having some chest and epigastric discomfort most of the day yesterday.  Those fever chills.  No cough shortness of breath or wheeze.  No nausea vomiting diarrhea or abdominal pain.  No other complaints at this time.          Review of Systems   Constitutional: Negative for fatigue and fever.   Respiratory: Negative for cough, shortness of breath and wheezing.    Cardiovascular: Positive for chest pain.   Musculoskeletal: Positive for arthralgias and back pain.   All other systems reviewed and are negative.      Past Medical History:   Diagnosis Date   • Acid reflux    • Anxiety    • Back pain    • Bipolar disorder (CMS/HCC)    • Depression 1999   • Drug-induced seizure (CMS/HCC)     2006 first, last 10/2012   • Hepatitis C    • Hepatitis C    • History of being tatooed    • Hypertension    • Insomnia    • Kidney stone 2012   • Seasonal allergies    • Seizure disorder (CMS/HCC)     LAST SEIZURE FALL OF 2012   • Tattoos     5       Allergies   Allergen Reactions   • Gabapentin Other (See Comments)     SEIZURES     • Keflex [Cephalexin] Nausea Only       Past Surgical History:   Procedure Laterality Date   • WISDOM TOOTH EXTRACTION         Family History   Problem Relation Age of Onset   • Arthritis Mother    • Mental illness Mother    • Inflammatory bowel disease Mother    • Arthritis Father    • Diabetes Father    • Hypertension Father    • Mental illness Father    • Colon cancer Maternal Uncle    • Cancer Neg Hx        Social History     Socioeconomic History   •  Marital status:      Spouse name: Not on file   • Number of children: Not on file   • Years of education: Not on file   • Highest education level: Not on file   Tobacco Use   • Smoking status: Current Every Day Smoker     Packs/day: 1.00     Years: 21.00     Pack years: 21.00     Types: Cigarettes   • Smokeless tobacco: Current User     Types: Chew   Substance and Sexual Activity   • Alcohol use: Yes     Alcohol/week: 6.0 standard drinks     Types: 6 Cans of beer per week     Frequency: Monthly or less   • Drug use: Yes     Types: Heroin, Oxycodone     Comment: previous IV-quit 2018   • Sexual activity: Defer           Objective   Physical Exam  Vitals signs and nursing note reviewed.   Constitutional:       General: He is not in acute distress.     Appearance: He is well-developed. He is not diaphoretic.   HENT:      Head: Normocephalic and atraumatic.      Nose: Nose normal.   Eyes:      Conjunctiva/sclera: Conjunctivae normal.      Pupils: Pupils are equal, round, and reactive to light.   Cardiovascular:      Rate and Rhythm: Normal rate and regular rhythm.   Pulmonary:      Effort: Pulmonary effort is normal. No respiratory distress.      Breath sounds: Normal breath sounds.   Abdominal:      General: There is no distension.      Palpations: Abdomen is soft.      Tenderness: There is no abdominal tenderness.   Musculoskeletal:         General: No deformity.   Neurological:      Mental Status: He is alert and oriented to person, place, and time.      Cranial Nerves: No cranial nerve deficit.      Coordination: Coordination normal.         Procedures           ED Course      PULSE OXIMETRY INTERPRETATION  Patient had a pulse ox of 99% on room air. This is a normal pulse oximetry reading.     EKG interpreted by me.  Sinus rhythm.  Normal rate.  No ST segment or T wave changes.  Normal EKG                     Heart Score 1.     Chest x-ray interpreted by me shows no evidence of any cardiomegaly, effusion,  infiltrate, or bony abnormality.        MDM  35-year-old male presents with chest pain began 530 this morning.  States he had back pain radiating into his legs.  Also reported chest pain as well.  Denies any fever cough sick contacts or travel.  Per my evaluation patient has normal room air sats 98%.  Patient is nontoxic denies any pain now.  Refusing repeat troponin or EKG.  Declined Covid testing.  No focal weakness to suggest transverse myelitis or cauda equina.  Recommended strict return precautions, outpatient follow up.   Final diagnoses:   Chest pain, unspecified type            Luis Enrique Alexander, DO  12/28/20 0833

## 2021-12-08 ENCOUNTER — TRANSCRIBE ORDERS (OUTPATIENT)
Dept: GENERAL RADIOLOGY | Facility: HOSPITAL | Age: 36
End: 2021-12-08

## 2021-12-08 ENCOUNTER — HOSPITAL ENCOUNTER (OUTPATIENT)
Dept: GENERAL RADIOLOGY | Facility: HOSPITAL | Age: 36
Discharge: HOME | End: 2021-12-08
Admitting: NURSE PRACTITIONER

## 2021-12-08 DIAGNOSIS — M54.2 NECK PAIN: ICD-10-CM

## 2021-12-08 DIAGNOSIS — M25.512 BILATERAL SHOULDER PAIN, UNSPECIFIED CHRONICITY: ICD-10-CM

## 2021-12-08 DIAGNOSIS — M54.50 LOW BACK PAIN, UNSPECIFIED BACK PAIN LATERALITY, UNSPECIFIED CHRONICITY, UNSPECIFIED WHETHER SCIATICA PRESENT: ICD-10-CM

## 2021-12-08 DIAGNOSIS — M54.2 NECK PAIN: Primary | ICD-10-CM

## 2021-12-08 DIAGNOSIS — M25.511 BILATERAL SHOULDER PAIN, UNSPECIFIED CHRONICITY: ICD-10-CM

## 2021-12-08 PROCEDURE — 72110 X-RAY EXAM L-2 SPINE 4/>VWS: CPT

## 2021-12-08 PROCEDURE — 73030 X-RAY EXAM OF SHOULDER: CPT

## 2021-12-08 PROCEDURE — 72050 X-RAY EXAM NECK SPINE 4/5VWS: CPT

## 2022-03-23 ENCOUNTER — HOSPITAL ENCOUNTER (EMERGENCY)
Facility: HOSPITAL | Age: 37
Discharge: LEFT BEFORE DISCHARGE | End: 2022-03-23

## 2022-03-23 VITALS
WEIGHT: 198 LBS | TEMPERATURE: 98 F | DIASTOLIC BLOOD PRESSURE: 113 MMHG | HEART RATE: 121 BPM | OXYGEN SATURATION: 97 % | BODY MASS INDEX: 26.82 KG/M2 | HEIGHT: 72 IN | RESPIRATION RATE: 16 BRPM | SYSTOLIC BLOOD PRESSURE: 170 MMHG

## 2022-03-23 PROCEDURE — 99211 OFF/OP EST MAY X REQ PHY/QHP: CPT

## 2022-06-20 DIAGNOSIS — M25.541 ARTHRALGIA OF RIGHT HAND: Primary | ICD-10-CM

## 2023-10-30 ENCOUNTER — HOSPITAL ENCOUNTER (EMERGENCY)
Facility: HOSPITAL | Age: 38
Discharge: HOME | End: 2023-10-30
Attending: EMERGENCY MEDICINE | Admitting: EMERGENCY MEDICINE
Payer: MEDICAID

## 2023-10-30 VITALS
TEMPERATURE: 97.7 F | HEIGHT: 72 IN | RESPIRATION RATE: 16 BRPM | BODY MASS INDEX: 27.09 KG/M2 | HEART RATE: 79 BPM | OXYGEN SATURATION: 96 % | SYSTOLIC BLOOD PRESSURE: 131 MMHG | DIASTOLIC BLOOD PRESSURE: 68 MMHG | WEIGHT: 200 LBS

## 2023-10-30 DIAGNOSIS — F10.920 ACUTE ALCOHOLIC INTOXICATION WITHOUT COMPLICATION: Primary | ICD-10-CM

## 2023-10-30 LAB
ALBUMIN SERPL-MCNC: 4.8 G/DL (ref 3.5–5.2)
ALBUMIN/GLOB SERPL: 1.2 G/DL
ALP SERPL-CCNC: 102 U/L (ref 39–117)
ALT SERPL W P-5'-P-CCNC: 56 U/L (ref 1–41)
AMPHET+METHAMPHET UR QL: NEGATIVE
AMPHETAMINES UR QL: NEGATIVE
ANION GAP SERPL CALCULATED.3IONS-SCNC: 11.1 MMOL/L (ref 5–15)
APAP SERPL-MCNC: <5 MCG/ML (ref 0–30)
AST SERPL-CCNC: 55 U/L (ref 1–40)
BARBITURATES UR QL SCN: NEGATIVE
BASOPHILS # BLD AUTO: 0.04 10*3/MM3 (ref 0–0.2)
BASOPHILS NFR BLD AUTO: 0.4 % (ref 0–1.5)
BENZODIAZ UR QL SCN: NEGATIVE
BILIRUB SERPL-MCNC: 0.5 MG/DL (ref 0–1.2)
BILIRUB UR QL STRIP: NEGATIVE
BUN SERPL-MCNC: 5 MG/DL (ref 6–20)
BUN/CREAT SERPL: 5.7 (ref 7–25)
BUPRENORPHINE SERPL-MCNC: NEGATIVE NG/ML
CALCIUM SPEC-SCNC: 9.8 MG/DL (ref 8.6–10.5)
CANNABINOIDS SERPL QL: NEGATIVE
CHLORIDE SERPL-SCNC: 107 MMOL/L (ref 98–107)
CLARITY UR: CLEAR
CO2 SERPL-SCNC: 25.9 MMOL/L (ref 22–29)
COCAINE UR QL: POSITIVE
COLOR UR: YELLOW
CREAT SERPL-MCNC: 0.87 MG/DL (ref 0.76–1.27)
DEPRECATED RDW RBC AUTO: 42.2 FL (ref 37–54)
EGFRCR SERPLBLD CKD-EPI 2021: 111.9 ML/MIN/1.73
EOSINOPHIL # BLD AUTO: 0.02 10*3/MM3 (ref 0–0.4)
EOSINOPHIL NFR BLD AUTO: 0.2 % (ref 0.3–6.2)
ERYTHROCYTE [DISTWIDTH] IN BLOOD BY AUTOMATED COUNT: 13.4 % (ref 12.3–15.4)
ETHANOL BLD-MCNC: 121 MG/DL (ref 0–10)
ETHANOL BLD-MCNC: 200 MG/DL (ref 0–10)
ETHANOL UR QL: 0.12 %
ETHANOL UR QL: 0.2 %
FENTANYL UR-MCNC: NEGATIVE NG/ML
GLOBULIN UR ELPH-MCNC: 4.1 GM/DL
GLUCOSE SERPL-MCNC: 115 MG/DL (ref 65–99)
GLUCOSE UR STRIP-MCNC: NEGATIVE MG/DL
HCT VFR BLD AUTO: 49.9 % (ref 37.5–51)
HGB BLD-MCNC: 17.3 G/DL (ref 13–17.7)
HGB UR QL STRIP.AUTO: NEGATIVE
HOLD SPECIMEN: NORMAL
HOLD SPECIMEN: NORMAL
IMM GRANULOCYTES # BLD AUTO: 0.02 10*3/MM3 (ref 0–0.05)
IMM GRANULOCYTES NFR BLD AUTO: 0.2 % (ref 0–0.5)
KETONES UR QL STRIP: NEGATIVE
LEUKOCYTE ESTERASE UR QL STRIP.AUTO: NEGATIVE
LYMPHOCYTES # BLD AUTO: 3.56 10*3/MM3 (ref 0.7–3.1)
LYMPHOCYTES NFR BLD AUTO: 38.9 % (ref 19.6–45.3)
MAGNESIUM SERPL-MCNC: 2.3 MG/DL (ref 1.6–2.6)
MCH RBC QN AUTO: 30.1 PG (ref 26.6–33)
MCHC RBC AUTO-ENTMCNC: 34.7 G/DL (ref 31.5–35.7)
MCV RBC AUTO: 86.9 FL (ref 79–97)
METHADONE UR QL SCN: NEGATIVE
MONOCYTES # BLD AUTO: 0.46 10*3/MM3 (ref 0.1–0.9)
MONOCYTES NFR BLD AUTO: 5 % (ref 5–12)
NEUTROPHILS NFR BLD AUTO: 5.04 10*3/MM3 (ref 1.7–7)
NEUTROPHILS NFR BLD AUTO: 55.3 % (ref 42.7–76)
NITRITE UR QL STRIP: NEGATIVE
NRBC BLD AUTO-RTO: 0 /100 WBC (ref 0–0.2)
OPIATES UR QL: NEGATIVE
OXYCODONE UR QL SCN: NEGATIVE
PCP UR QL SCN: NEGATIVE
PH UR STRIP.AUTO: 6.5 [PH] (ref 5–8)
PLATELET # BLD AUTO: 227 10*3/MM3 (ref 140–450)
PMV BLD AUTO: 10.3 FL (ref 6–12)
POTASSIUM SERPL-SCNC: 3.4 MMOL/L (ref 3.5–5.2)
PROT SERPL-MCNC: 8.9 G/DL (ref 6–8.5)
PROT UR QL STRIP: NEGATIVE
RBC # BLD AUTO: 5.74 10*6/MM3 (ref 4.14–5.8)
SALICYLATES SERPL-MCNC: <0.3 MG/DL
SODIUM SERPL-SCNC: 144 MMOL/L (ref 136–145)
SP GR UR STRIP: <=1.005 (ref 1–1.03)
TRICYCLICS UR QL SCN: NEGATIVE
UROBILINOGEN UR QL STRIP: NORMAL
WBC NRBC COR # BLD: 9.14 10*3/MM3 (ref 3.4–10.8)
WHOLE BLOOD HOLD COAG: NORMAL
WHOLE BLOOD HOLD SPECIMEN: NORMAL

## 2023-10-30 PROCEDURE — 80143 DRUG ASSAY ACETAMINOPHEN: CPT | Performed by: EMERGENCY MEDICINE

## 2023-10-30 PROCEDURE — 93005 ELECTROCARDIOGRAM TRACING: CPT | Performed by: EMERGENCY MEDICINE

## 2023-10-30 PROCEDURE — 81003 URINALYSIS AUTO W/O SCOPE: CPT | Performed by: EMERGENCY MEDICINE

## 2023-10-30 PROCEDURE — 83735 ASSAY OF MAGNESIUM: CPT | Performed by: EMERGENCY MEDICINE

## 2023-10-30 PROCEDURE — 80053 COMPREHEN METABOLIC PANEL: CPT | Performed by: EMERGENCY MEDICINE

## 2023-10-30 PROCEDURE — 82077 ASSAY SPEC XCP UR&BREATH IA: CPT | Performed by: EMERGENCY MEDICINE

## 2023-10-30 PROCEDURE — 85025 COMPLETE CBC W/AUTO DIFF WBC: CPT | Performed by: EMERGENCY MEDICINE

## 2023-10-30 PROCEDURE — 80179 DRUG ASSAY SALICYLATE: CPT | Performed by: EMERGENCY MEDICINE

## 2023-10-30 PROCEDURE — 80307 DRUG TEST PRSMV CHEM ANLYZR: CPT | Performed by: EMERGENCY MEDICINE

## 2023-10-30 PROCEDURE — 99285 EMERGENCY DEPT VISIT HI MDM: CPT

## 2023-10-30 PROCEDURE — 36415 COLL VENOUS BLD VENIPUNCTURE: CPT

## 2023-10-30 RX ORDER — HYDROXYZINE PAMOATE 50 MG/1
50 CAPSULE ORAL ONCE
Status: COMPLETED | OUTPATIENT
Start: 2023-10-30 | End: 2023-10-30

## 2023-10-30 RX ORDER — SODIUM CHLORIDE 0.9 % (FLUSH) 0.9 %
10 SYRINGE (ML) INJECTION AS NEEDED
Status: DISCONTINUED | OUTPATIENT
Start: 2023-10-30 | End: 2023-10-30 | Stop reason: HOSPADM

## 2023-10-30 RX ADMIN — HYDROXYZINE PAMOATE 50 MG: 50 CAPSULE ORAL at 05:11

## 2023-10-30 NOTE — ED NOTES
"Pt states that he took trazodone tonight in hopes of trying to hurt himself. PT's social media post stated \" I'll be dead by morning\". PT states that he is SI \"just tired of life\".     [employer] officer stated that if the pt decides to go back on being voluntary to call [employer] and they can EMD him if necessary.   "

## 2023-10-30 NOTE — CONSULTS
"Rafael Todd  1985      Race/Ethnicity: White or   Martial Status:   Guardian Name/Contact/etc: self  Pt Lives With:  wife   Occupation: unemployed  Appearance: disheveled, unkempt     Time Called for Assessment: 10:00  Assessment Start and End: 10:00-10:20      DATA:   Clinician received a call from [facility] staff for a behavioral health consult.  The patient is agreeable to speak with the behavioral health team.  Met with patient at bedside. Patient is under 1:1 security monitoring.  The attending treatment team is Vlad RN and Dr. Sanz, Provider.  Patient presents today with chief compliant of suicide attempt. Patient reports that he was brought to the ED by [employer]. Patient reports that he was drinking alcohol last night and \"got tired of life\" and took trazadone (reports he is unsure how much) in an attempt to kill himself. Patient reports he is glad it didn't work and is regretful and felt that he was \"drunk and stupid\". Patient denies SI/HI at this time and agreed to a safety plan. Patient is open to outpatient treatment and was given resources..  Clinician completed assessment with patient and observations are documented as follows.    ASSESSMENT:    Clinician consulted with patient for mental status exam and assessment.  Clinical descriptors are documented as follows.  Clinician completed CSSRS with patient for suicide risk assessment.  The results of patient’s CSSRS documented as follows.    Presenting Problems:  suicide attempt  Current Stressors: chemical dependency/abuse, employment concerns, inadequate services, job change/loss, and limited support system     Established Therapy, Medication Management or Other Mental Health Services: none   Current Psychiatric Medications: none       Mental Status Exam:  Behavior: Anxious  Psychomotor Movement: Appropriate  Attention and Cooperation: Adequate and Cooperative  Mood: anxious and Affect: Appropriate  Orientation: alert and " oriented to person, place, and time   Thought Process: linear, logical, and goal directed  Thought Content: normal  Delusions:  none    Hallucinations: None   Concentration: Normal  Suicidal Ideation: Absent  Homicidal Ideation: Absent  Hopelessness: no  Speech: Normal  Eye Contact: Non-sustained  Insight: fair  Judgement: poor    Depression: 5   Anxiety: 10  Sleep: Fair   Appetite: Fair       Hx of Psychiatric or Detox Hospitalizations:  No  Most recent inpatient admission: n/a    Suicidal Ideation Assessment:    [place]IA-SUICIDE SEVERITY RATING SCALE  Psychiatric Inpatient Setting - Discharge Screener    Ask questions that are bold and underlined Discharge   Ask Questions 1 and 2 YES NO   Wish to be Dead:   Person endorses thoughts about a wish to be dead or not alive anymore, or wish to fall asleep and not wake up.  While you were here in the hospital, have you wished you were dead or wished you could go to sleep and not wake up?  x   Suicidal Thoughts:   General non-specific thoughts of wanting to end one's life/die by suicide, “I've thought about killing myself” without general thoughts of ways to kill oneself/associated methods, intent, or plan.   While you were here in the hospital, have you actually had thoughts about killing yourself?   x   If YES to 2, ask questions 3, 4, 5, and 6.  If NO to 2, go directly to question 6   3) Suicidal Thoughts with Method (without Specific Plan or Intent to Act):   Person endorses thoughts of suicide and has thought of a least one method during the assessment period. This is different than a specific plan with time, place or method details worked out. “I thought about taking an overdose but I never made a specific plan as to when where or how I would actually do it….and I would never go through with it.”   Have you been thinking about how you might kill yourself?      4) Suicidal Intent (without Specific Plan):   Active suicidal thoughts of killing oneself and patient  reports having some intent to act on such thoughts, as opposed to “I have the thoughts but I definitely will not do anything about them.”   Have you had these thoughts and had some intention of acting on them or do you have some intention of acting on them after you leave the hospital?      5) Suicide Intent with Specific Plan:   Thoughts of killing oneself with details of plan fully or partially worked out and person has some intent to carry it out.   Have you started to work out or worked out the details of how to kill yourself either for while you were here in the hospital or for after you leave the hospital? Do you intend to carry out this plan?        6) Suicide Behavior    While you were here in the hospital, have you done anything, started to do anything, or prepared to do anything to end your life?    Examples: Took pills, cut yourself, tried to hang yourself, took out pills but didn't swallow any because you changed your mind or someone took them from you, collected pills, secured a means of obtaining a gun, gave away valuables, wrote a will or suicide note, etc.  x     Suicidal: Absent - patient is denying SI and expressing remorse for suicide attempt.   Previous Attempts: One prior suicide attempt  Most Recent Attempt: today    Psychosocial History    Highest Level of Education:  unknown to clinician     Family Hx of Mental Health/Substance Abuse: No  Patient Trauma/Abuse History: Further details: did not disclose     Does this require reporting: N/A  Patient Identified Support System (List family members, loved ones, guardians, friends, etc): wife and step children    Legal History / History of Violence: None known   Experience with Interpersonal Violence: No  History of Inappropriate Sexual Behavior: No  Current Medical Conditions or Biomedical Complications: No     Social Determinants of Health  Housing Instability and/or Utility Needs: No  Food Insecurity: No  Transportation Needs: No    Substance Use  History  Active Use: Yes, describe: alcohol and cocaine   If yes, what is active: Alcohol and Cocaine  History of Use: Patient reports past history of opiate use over the course of the past 10 years, reports is sober and taking suboxane from the street at this time.  Does the patient have history or current MAT/MOUD: Yes, describe: reports he is taking suboxane from the street but has not had any for one week.   Withdrawal Symptoms: No  History of DT's: No  History of Seizures: Yes  Recovery Environment: limited    PLAN:  At this time, clinician recommends outpatient treatment based upon the above assessment.   Clinician collaborated with the treatment team who agree to adopt the recommendations.  Clinician discussed recommendations with patient and/or patient support systems, and patient is agreeable to the plan.     Have the levels of care been discussed with the patient? Yes  Level of care recommendation: outpatient  Is patient agreeable to treatment? Yes    Safety Planning:  Does the patient have access to weapons or firearms? No  Did clinician discuss securing weapons, firearms, sharps and/or medications with the patient? Yes  Safety Plan: Clinician verbally engaged in safety planning by assisting the patient in identifying risk factors that would indicate the need for higher level of care, such as thoughts to harm self or others and/or self-harming behavior(s). Safety plan of report to nearest hospital, calling local police or 911 if feeling unsafe, if having suicidal or homicidal thoughts, or if in emergent need of medications verbally reviewed with patient during assessment and suicide prevention/crisis hotlines verbally reviewed with patient during assessment. Patient during assessment verbally agreed to safety plan. Reviewed resources of crisis hotlines or presenting to the nearest emergency department should symptoms worsen, or in any crisis/emergency. Patient agreeable and voiced understanding.      Although the patient has potential to benefit from the inpatient level of care, the patient is not agreeable for an acute admission at this time.  Patient does not present with criteria to warrant an involuntary psychiatric hold at this time as they are not endorsing active suicidal ideation with plan/intent, homicidal ideation with plan/intent or displaying symptoms of an acute psychotic episode without ability to appropriately plan for safety at a lesser restrictive level of care.  The patient identified proactive factors and is agreeable to establish/engage in outpatient behavioral health services.  Clinician provided resources for outpatient services and discussed the availability of emergency behavioral health services 24/7 through the [facility].  Clinician verbally engaged in safety planning by assisting the patient in identifying risk factors that would indicate the need for higher level of care, such as thoughts to harm self or others and/or self-harming behavior(s). Safety plan of report to nearest hospital, calling local police or 911 if feeling unsafe, if having suicidal or homicidal thoughts, or if in emergent need of medications verbally reviewed with patient during assessment and suicide prevention/crisis hotlines verbally reviewed with patient during assessment. Patient during assessment verbally agreed to safety plan. Reviewed resources of crisis hotlines or presenting to the nearest emergency department should symptoms worsen, or in any crisis/emergency. Patient agreeable and voiced understanding.       Bethany Patterson, CSW, MSW

## 2023-10-30 NOTE — ED PROVIDER NOTES
HPI: Rafael Todd is a 38 y.o. male who presents to the emergency department complaining of suicidal ideations.  Patient states that he drank alcohol and took some trazodone in an effort to kill himself.  He states that he is felt like this for some time.  He denies any other complaints at this time.      REVIEW OF SYSTEMS: All other systems reviewed and are negative     PAST MEDICAL HISTORY:   Past Medical History:   Diagnosis Date    Acid reflux     Anxiety     Back pain     Bipolar disorder     Depression 1999    Drug-induced seizure     2006 first, last 10/2012    Hepatitis C     Hepatitis C     History of being tatooed     Hypertension     Insomnia     Kidney stone 2012    Seasonal allergies     Seizure disorder     LAST SEIZURE FALL OF 2012    Tattoos     5        FAMILY HISTORY:   Family History   Problem Relation Age of Onset    Arthritis Mother     Mental illness Mother     Inflammatory bowel disease Mother     Arthritis Father     Diabetes Father     Hypertension Father     Mental illness Father     Colon cancer Maternal Uncle     Cancer Neg Hx         SOCIAL HISTORY:   Social History     Socioeconomic History    Marital status:    Tobacco Use    Smoking status: Every Day     Packs/day: 1.00     Years: 21.00     Additional pack years: 0.00     Total pack years: 21.00     Types: Cigarettes    Smokeless tobacco: Current     Types: Chew   Vaping Use    Vaping Use: Never used   Substance and Sexual Activity    Alcohol use: Yes     Alcohol/week: 6.0 standard drinks of alcohol     Types: 6 Cans of beer per week    Drug use: Yes     Frequency: 3.0 times per week     Types: Cocaine(coke)     Comment: previous IV-quit 2018    Sexual activity: Defer        SURGICAL HISTORY:   Past Surgical History:   Procedure Laterality Date    WISDOM TOOTH EXTRACTION          ALLERGIES: Gabapentin and Keflex [cephalexin]       PHYSICAL EXAM:   VITAL SIGNS:   Vitals:    10/30/23 1040   BP:    Pulse: 79   Resp:    Temp:     SpO2: 96%      CONSTITUTIONAL: Awake, well appearing, nontoxic   HENT: Atraumatic, normocephalic, oral mucosa moist, airway patent. Nares patent without drainage. External ears normal.   EYES: Conjunctivae clear, EOMI, PERRL   NECK: Trachea midline, nontender, supple   CARDIOVASCULAR: Normal heart rate, Normal rhythm.  PULMONARY/CHEST: Normal work of breathing. Clear to auscultation, no rhonchi, wheezes, or rales.  ABDOMINAL: Nondistended, soft, nontender, no rebound or guarding.  NEUROLOGIC: Nonfocal, moves all four extremities, no gross sensory or motor deficits.   EXTREMITIES: No clubbing, cyanosis, or edema   SKIN: Warm, Dry, No erythema, No rash       ED COURSE / MEDICAL DECISION MAKING:     Rafael Todd is a 38 y.o. male who presents to the emergency department for evaluation of suicidal ideations, intentional overdose.  Well-developed, well-nourished man in no distress with exam as above.  His vital signs are normal.  His oxygen saturation is normal on room air at 96%.  Will obtain labs, EKG.  Will consult poison control and behavioral health once clear.  Disposition pending.    Differential diagnosis includes depression, anxiety, substance abuse, overdose, suicidal ideations among other etiologies.    EKG interpreted by me: Sinus rhythm, normal rate, no acute ST/T changes,  this is a normal EKG    Brandon Sanz DO  I received this patient on signout from Dr. Sheffield.  Patient presented to the ED acutely intoxicated with suicidal thoughts.  Patient was monitored here in the ED until he was no longer intoxicated.  He was seen and evaluated by behavioral health and when he was not intoxicated he adamantly denied suicidal thoughts or plan.  He was deemed not a danger to harm himself.    Final diagnoses:   Acute alcoholic intoxication without complication        Brandon Sanz DO  11/03/23 0008

## 2024-01-12 ENCOUNTER — TRANSCRIBE ORDERS (OUTPATIENT)
Dept: ADMINISTRATIVE | Facility: HOSPITAL | Age: 39
End: 2024-01-12
Payer: MEDICAID

## 2024-01-12 DIAGNOSIS — E03.9 HYPOTHYROIDISM, UNSPECIFIED TYPE: Primary | ICD-10-CM

## 2024-01-23 ENCOUNTER — TRANSCRIBE ORDERS (OUTPATIENT)
Dept: ADMINISTRATIVE | Facility: HOSPITAL | Age: 39
End: 2024-01-23
Payer: MEDICAID

## 2024-01-23 DIAGNOSIS — R22.32 LOCALIZED SWELLING, MASS AND LUMP, UPPER LIMB, LEFT: Primary | ICD-10-CM

## 2024-06-07 ENCOUNTER — SPECIALTY PHARMACY (OUTPATIENT)
Dept: PHARMACY | Facility: HOSPITAL | Age: 39
End: 2024-06-07
Payer: MEDICAID

## 2024-06-07 ENCOUNTER — LAB (OUTPATIENT)
Dept: LAB | Facility: HOSPITAL | Age: 39
End: 2024-06-07
Payer: MEDICAID

## 2024-06-07 VITALS
HEART RATE: 103 BPM | SYSTOLIC BLOOD PRESSURE: 144 MMHG | DIASTOLIC BLOOD PRESSURE: 95 MMHG | BODY MASS INDEX: 27.64 KG/M2 | OXYGEN SATURATION: 97 % | WEIGHT: 203.8 LBS | TEMPERATURE: 98.7 F

## 2024-06-07 DIAGNOSIS — F10.21 HISTORY OF ALCOHOLISM: ICD-10-CM

## 2024-06-07 DIAGNOSIS — F19.91 HISTORY OF ILLICIT DRUG USE: ICD-10-CM

## 2024-06-07 DIAGNOSIS — R74.8 ELEVATED LIVER ENZYMES: ICD-10-CM

## 2024-06-07 DIAGNOSIS — K21.9 GASTROESOPHAGEAL REFLUX DISEASE, UNSPECIFIED WHETHER ESOPHAGITIS PRESENT: ICD-10-CM

## 2024-06-07 DIAGNOSIS — K59.03 THERAPEUTIC OPIOID-INDUCED CONSTIPATION (OIC): ICD-10-CM

## 2024-06-07 DIAGNOSIS — B18.2 CHRONIC HEPATITIS C WITHOUT HEPATIC COMA: ICD-10-CM

## 2024-06-07 DIAGNOSIS — T40.2X5A THERAPEUTIC OPIOID-INDUCED CONSTIPATION (OIC): ICD-10-CM

## 2024-06-07 DIAGNOSIS — B18.2 CHRONIC HEPATITIS C WITHOUT HEPATIC COMA: Primary | ICD-10-CM

## 2024-06-07 LAB
ALBUMIN SERPL-MCNC: 4.7 G/DL (ref 3.5–5.2)
ALBUMIN/GLOB SERPL: 1 G/DL
ALP SERPL-CCNC: 101 U/L (ref 39–117)
ALT SERPL W P-5'-P-CCNC: 39 U/L (ref 1–41)
ANION GAP SERPL CALCULATED.3IONS-SCNC: 11.2 MMOL/L (ref 5–15)
AST SERPL-CCNC: 43 U/L (ref 1–40)
BILIRUB SERPL-MCNC: 0.5 MG/DL (ref 0–1.2)
BUN SERPL-MCNC: 17 MG/DL (ref 6–20)
BUN/CREAT SERPL: 21 (ref 7–25)
CALCIUM SPEC-SCNC: 10.2 MG/DL (ref 8.6–10.5)
CHLORIDE SERPL-SCNC: 102 MMOL/L (ref 98–107)
CO2 SERPL-SCNC: 24.8 MMOL/L (ref 22–29)
CREAT SERPL-MCNC: 0.81 MG/DL (ref 0.76–1.27)
DEPRECATED RDW RBC AUTO: 40.7 FL (ref 37–54)
EGFRCR SERPLBLD CKD-EPI 2021: 113.6 ML/MIN/1.73
ERYTHROCYTE [DISTWIDTH] IN BLOOD BY AUTOMATED COUNT: 12.7 % (ref 12.3–15.4)
GLOBULIN UR ELPH-MCNC: 4.9 GM/DL
GLUCOSE SERPL-MCNC: 58 MG/DL (ref 65–99)
HBV SURFACE AB SER RIA-ACNC: REACTIVE
HBV SURFACE AG SERPL QL IA: NORMAL
HCT VFR BLD AUTO: 47.1 % (ref 37.5–51)
HGB BLD-MCNC: 16.1 G/DL (ref 13–17.7)
HIV 1+2 AB+HIV1 P24 AG SERPL QL IA: NORMAL
INR PPP: 1.09 (ref 0.9–1.1)
MCH RBC QN AUTO: 30.4 PG (ref 26.6–33)
MCHC RBC AUTO-ENTMCNC: 34.2 G/DL (ref 31.5–35.7)
MCV RBC AUTO: 89 FL (ref 79–97)
PLATELET # BLD AUTO: 267 10*3/MM3 (ref 140–450)
PMV BLD AUTO: 10.3 FL (ref 6–12)
POTASSIUM SERPL-SCNC: 3.9 MMOL/L (ref 3.5–5.2)
PROT SERPL-MCNC: 9.6 G/DL (ref 6–8.5)
PROTHROMBIN TIME: 14.6 SECONDS (ref 12.3–15.1)
RBC # BLD AUTO: 5.29 10*6/MM3 (ref 4.14–5.8)
SODIUM SERPL-SCNC: 138 MMOL/L (ref 136–145)
WBC NRBC COR # BLD AUTO: 7.41 10*3/MM3 (ref 3.4–10.8)

## 2024-06-07 PROCEDURE — G0463 HOSPITAL OUTPT CLINIC VISIT: HCPCS

## 2024-06-07 PROCEDURE — 85610 PROTHROMBIN TIME: CPT

## 2024-06-07 PROCEDURE — 86706 HEP B SURFACE ANTIBODY: CPT

## 2024-06-07 PROCEDURE — 87340 HEPATITIS B SURFACE AG IA: CPT

## 2024-06-07 PROCEDURE — 85027 COMPLETE CBC AUTOMATED: CPT

## 2024-06-07 PROCEDURE — 87902 NFCT AGT GNTYP ALYS HEP C: CPT

## 2024-06-07 PROCEDURE — 36415 COLL VENOUS BLD VENIPUNCTURE: CPT

## 2024-06-07 PROCEDURE — 86708 HEPATITIS A ANTIBODY: CPT

## 2024-06-07 PROCEDURE — 86704 HEP B CORE ANTIBODY TOTAL: CPT

## 2024-06-07 PROCEDURE — G0432 EIA HIV-1/HIV-2 SCREEN: HCPCS

## 2024-06-07 PROCEDURE — 80053 COMPREHEN METABOLIC PANEL: CPT

## 2024-06-07 PROCEDURE — 87522 HEPATITIS C REVRS TRNSCRPJ: CPT

## 2024-06-07 RX ORDER — DESVENLAFAXINE SUCCINATE 50 MG/1
50 TABLET, EXTENDED RELEASE ORAL DAILY
COMMUNITY
End: 2024-06-07 | Stop reason: DRUGHIGH

## 2024-06-07 RX ORDER — LINACLOTIDE 290 UG/1
290 CAPSULE, GELATIN COATED ORAL
COMMUNITY
Start: 2024-05-21

## 2024-06-07 RX ORDER — AMITRIPTYLINE HYDROCHLORIDE 50 MG/1
50 TABLET, FILM COATED ORAL NIGHTLY
COMMUNITY
Start: 2024-05-30

## 2024-06-07 RX ORDER — MONTELUKAST SODIUM 10 MG/1
10 TABLET ORAL DAILY
COMMUNITY

## 2024-06-07 RX ORDER — DESVENLAFAXINE 100 MG/1
100 TABLET, EXTENDED RELEASE ORAL DAILY
COMMUNITY
Start: 2024-05-13

## 2024-06-07 NOTE — LETTER
2024     PANFILO Richardson   [address]  [address]  [facility] [address]    Patient: Rafael Todd   YOB: 1985   Date of Visit: 2024       Dear PANFILO Richardson,    Thank you for referring Rafael Todd to me for evaluation. Below is a copy of my consult note.    If you have questions, please do not hesitate to call me. I look forward to following Rafael along with you.         Sincerely,        [facility]        CC: No Recipients         New Patient Consult      Date: 2024   Patient Name: Rafael Todd  MRN: [id number]  : 1985     Primary Care Provider: Keiry Barrera APRN  Referring Provider: Bruce    Chief Complaint   Patient presents with   • Hepatitis C     Pt here for initial Hep C eval     History of Present Illness: Rafael Todd is a 39 y.o. male who is here today as a consultation with Gastroenterology for evaluation of Hepatitis C.    He found out about having Hepatitis C infection approx 8 years ago. He has not had prior treatment for hepatitis. Reports no known personal history of liver disease including other viral hepatitis. There is no known family history of liver disease or cirrhosis. He admits to previous IVDU and intranasal drug use. Has been clean now for 10 years. He does have nonprofessional tattoos. Admits to having previous alcoholism. He drank daily and heavy (1 case of beer per day) for most of his life (at least 20-25 years). Now he is drinking at times, about 6 beers per month. He denies  current illicit drug use including marijuana. No recent liver imaging. He has not had recent labs. He has had previous vaccinations for Hepatitis A and B (he thinks). He is in between moving, was living in [place]. He is not currently working.    He has not had previous colonoscopy. He has had severe constipation in the past, would have large hard stools. Has been taking Linzess 290 mcg once daily. Now having a BM 2 times per day. No skip  days between stools. Does take Suboxone as prescribed for many years. There is no first degree known family history of colon cancer or colon polyps. Maternal uncles (2) had colon cancer.     Takes omeprazole 40 mg once daily for heartburn which controls his symptoms. No dysphagia. No prior EGD.     Subjective      Past Medical History:   Diagnosis Date   • Acid reflux    • Anxiety    • Back pain    • Bipolar disorder    • Depression 1999   • Drug-induced seizure     2006 first, last 10/2012   • Hepatitis C    • Hepatitis C    • History of being tatooed    • Hypertension    • Insomnia    • Kidney stone 2012   • Seasonal allergies    • Seizure disorder     LAST SEIZURE FALL OF 2012   • Tattoos     7     Past Surgical History:   Procedure Laterality Date   • WISDOM TOOTH EXTRACTION       Family History   Problem Relation Age of Onset   • Arthritis Mother    • Mental illness Mother    • Inflammatory bowel disease Mother    • Arthritis Father    • Diabetes Father    • Hypertension Father    • Mental illness Father    • Colon cancer Maternal Uncle    • Cancer Neg Hx      Social History     Socioeconomic History   • Marital status:    Tobacco Use   • Smoking status: Every Day     Current packs/day: 1.00     Average packs/day: 1 pack/day for 21.0 years (21.0 ttl pk-yrs)     Types: Cigarettes   • Smokeless tobacco: Current     Types: Chew   Vaping Use   • Vaping status: Some Days   • Substances: Nicotine, Flavoring   • Devices: Disposable   Substance and Sexual Activity   • Alcohol use: Yes     Alcohol/week: 6.0 standard drinks of alcohol     Types: 6 Cans of beer per week     Comment: socially   • Drug use: Yes     Frequency: 3.0 times per week     Types: Cocaine(coke)     Comment: previous IV-quit 2018   • Sexual activity: Defer     Current Outpatient Medications:   •  albuterol sulfate HFA (VENTOLIN HFA) 108 (90 Base) MCG/ACT inhaler, Inhale 2 puffs Every 6 (Six) Hours As Needed for Wheezing or Shortness of Air.,  Disp: 3 inhaler, Rfl: 4  •  amitriptyline (ELAVIL) 50 MG tablet, Take 1 tablet by mouth Every Night., Disp: , Rfl:   •  buprenorphine-naloxone (SUBOXONE) 8-2 MG per SL tablet, Place 1 tablet under the tongue Daily., Disp: , Rfl: 0  •  Cariprazine HCl (Vraylar) 1.5 MG capsule capsule, Take 1 capsule by mouth Daily., Disp: , Rfl:   •  desvenlafaxine (PRISTIQ) 100 MG 24 hr tablet, Take 1 tablet by mouth Daily., Disp: , Rfl:   •  ibuprofen (ADVIL,MOTRIN) 800 MG tablet, Take 1 tablet by mouth Every 8 (Eight) Hours As Needed for Mild Pain  or Moderate Pain ., Disp: 30 tablet, Rfl: 0  •  Linzess 290 MCG capsule capsule, Take 1 capsule by mouth Every Morning Before Breakfast., Disp: , Rfl:   •  montelukast (SINGULAIR) 10 MG tablet, Take 1 tablet by mouth Daily., Disp: , Rfl:   •  Multiple Vitamins-Minerals (MULTI ADULT GUMMIES PO), , Disp: , Rfl:   •  omeprazole (priLOSEC) 40 MG capsule, TAKE 1 CAPSULE BY MOUTH DAILY, Disp: 30 capsule, Rfl: 0    Allergies   Allergen Reactions   • Gabapentin Other (See Comments)     SEIZURES     • Keflex [Cephalexin] Nausea Only     The following portions of the patient's history were reviewed and updated as appropriate: allergies, current medications, past family history, past medical history, past social history, past surgical history and problem list.    Objective     Physical Exam  Vitals reviewed.   Constitutional:       General: He is not in acute distress.     Appearance: Normal appearance. He is well-developed. He is not ill-appearing or diaphoretic.   HENT:      Head: Normocephalic and atraumatic.      Right Ear: External ear normal.      Left Ear: External ear normal.      Nose: Nose normal.   Eyes:      General: No scleral icterus.        Right eye: No discharge.         Left eye: No discharge.      Conjunctiva/sclera: Conjunctivae normal.   Neck:      Vascular: No JVD.   Cardiovascular:      Rate and Rhythm: Normal rate and regular rhythm.      Heart sounds: Normal heart sounds.  No murmur heard.     No friction rub. No gallop.   Pulmonary:      Effort: Pulmonary effort is normal. No respiratory distress.      Breath sounds: Normal breath sounds. No wheezing or rales.   Chest:      Chest wall: No tenderness.   Abdominal:      General: Bowel sounds are normal. There is no distension.      Palpations: Abdomen is soft. There is no mass.      Tenderness: There is no abdominal tenderness. There is no guarding.   Musculoskeletal:         General: No deformity. Normal range of motion.      Cervical back: Normal range of motion.   Skin:     General: Skin is warm and dry.      Findings: No erythema or rash.      Comments: tattoos   Neurological:      Mental Status: He is alert and oriented to person, place, and time.      Coordination: Coordination normal.   Psychiatric:         Mood and Affect: Mood normal.         Behavior: Behavior normal.         Thought Content: Thought content normal.         Judgment: Judgment normal.       Vitals:    06/07/24 1135   BP: 144/95   Pulse: 103   Temp: 98.7 °F (37.1 °C)   SpO2: 97%   Weight: 92.4 kg (203 lb 12.8 oz)     Results Review:   I have reviewed the patient's new clinical and imaging results.    Admission on 10/30/2023, Discharged on 10/30/2023   Component Date Value Ref Range Status   • Glucose 10/30/2023 115 (H)  65 - 99 mg/dL Final   • BUN 10/30/2023 5 (L)  6 - 20 mg/dL Final   • Creatinine 10/30/2023 0.87  0.76 - 1.27 mg/dL Final   • Sodium 10/30/2023 144  136 - 145 mmol/L Final   • Potassium 10/30/2023 3.4 (L)  3.5 - 5.2 mmol/L Final   • Chloride 10/30/2023 107  98 - 107 mmol/L Final   • CO2 10/30/2023 25.9  22.0 - 29.0 mmol/L Final   • Calcium 10/30/2023 9.8  8.6 - 10.5 mg/dL Final   • Total Protein 10/30/2023 8.9 (H)  6.0 - 8.5 g/dL Final   • Albumin 10/30/2023 4.8  3.5 - 5.2 g/dL Final   • ALT (SGPT) 10/30/2023 56 (H)  1 - 41 U/L Final   • AST (SGOT) 10/30/2023 55 (H)  1 - 40 U/L Final   • Alkaline Phosphatase 10/30/2023 102  39 - 117 U/L Final   •  Total Bilirubin 10/30/2023 0.5  0.0 - 1.2 mg/dL Final   • Globulin 10/30/2023 4.1  gm/dL Final   • A/G Ratio 10/30/2023 1.2  g/dL Final   • BUN/Creatinine Ratio 10/30/2023 5.7 (L)  7.0 - 25.0 Final   • Anion Gap 10/30/2023 11.1  5.0 - 15.0 mmol/L Final   • eGFR 10/30/2023 111.9  >60.0 mL/min/1.73 Final   • Acetaminophen 10/30/2023 <5.0  0.0 - 30.0 mcg/mL Final   • Ethanol 10/30/2023 200 (H)  0 - 10 mg/dL Final   • Ethanol % 10/30/2023 0.200  % Final   • Salicylate 10/30/2023 <0.3  <=30.0 mg/dL Final   • WBC 10/30/2023 9.14  3.40 - 10.80 10*3/mm3 Final   • RBC 10/30/2023 5.74  4.14 - 5.80 10*6/mm3 Final   • Hemoglobin 10/30/2023 17.3  13.0 - 17.7 g/dL Final   • Hematocrit 10/30/2023 49.9  37.5 - 51.0 % Final   • MCV 10/30/2023 86.9  79.0 - 97.0 fL Final   • MCH 10/30/2023 30.1  26.6 - 33.0 pg Final   • MCHC 10/30/2023 34.7  31.5 - 35.7 g/dL Final   • RDW 10/30/2023 13.4  12.3 - 15.4 % Final   • RDW-SD 10/30/2023 42.2  37.0 - 54.0 fl Final   • MPV 10/30/2023 10.3  6.0 - 12.0 fL Final   • Platelets 10/30/2023 227  140 - 450 10*3/mm3 Final   • Fentanyl, Urine 10/30/2023 Negative  Negative Final   • Ethanol 10/30/2023 121 (H)  0 - 10 mg/dL Final   • Ethanol % 10/30/2023 0.121  % Final      CTAP stone protocol 5/2020  FINDINGS:  ABDOMEN: The lung bases are clear.  The heart size is normal.  Limited images of the liver are unremarkable.  The spleen is  normal.  No adrenal mass is identified.  The aorta is normal in  caliber.  There is no significant free fluid or adenopathy.  There is no nephrolithiasis.  There is no hydronephrosis.  The  gallbladder is present.  PELVIS: The appendix is not identified.  The urinary bladder is unremarkable.  There is no significant  free fluid or adenopathy.  There is gas and stool throughout the  colon.  IMPRESSION:  No hydronephrosis or nephrolithiasis.     Assessment / Plan      1. Chronic hepatitis C without hepatic coma  2. Elevated liver enzymes  3. History of alcoholism  4. History  of illicit drug use  He has chronic Hepatitis C infection, genotype unknown, treatment naive, without suspected cirrhosis. Liver enzymes have been elevated in the past. He also previously drank 1 case of beer per day for about 20 years. Over the past several years, he has been drinking only occasionally, about 6 beers per month. No current illicit drug use, has been clean for 10 years, now taking Suboxone.     More recommendations will be made after these results have been reviewed. He will continue to abstain from alcohol and illegal drugs. Immunity to Hep A and B will be determined and vaccinations recommended if needed. After review of these results and discussion with with the patient, we will proceed with Hep C therapy and will submit Rx to insurance company for approval. Treatment will depend on fibrosis score and Hep C genotype. When approved, he will  Rx from our pharmacy and start taking exactly as directed. Hep C viral load lab (HCV RNA quant) and CMP will be checked 4 weeks after start of therapy, at end of therapy and 3 months s/p therapy to determine response to treatment and cure. He will call with concerns.     - CBC (No Diff); Future  - Comprehensive Metabolic Panel; Future  - HCV FibroSURE; Future  - HCV RNA By PCR, Qn Rfx Mone; Future  - Hepatitis A Antibody, Total; Future  - Hepatitis B Core Antibody, Total; Future  - Hepatitis B Surface Antibody; Future  - Hepatitis B Surface Antigen; Future  - HIV-1 / O / 2 Ag / Antibody; Future  - Protime-INR; Future    5. Therapeutic opioid-induced constipation (OIC)  Was having very severe constipation with several skip days between bowel movements. His PCP prescribed Linzess 290 mcg once daily which he has been taking with dramatic improvements. Never had a colonoscopy. TSH in the past normal, last in 2020. No recent abdominal imaging.     Consider serum celiac testing in the future  Consider colonoscopy soon if symptoms persist  Continue Linzess 290  mcg once daily    6. Gastroesophageal reflux disease, unspecified whether esophagitis present  Has long history of reflux symptoms. Has been on PPI daily with great improvements. No dysphagia. No prior EGD.     Acid reflux measures  Continue PPI for now  Consider EGD in the future    Follow Up:   Return in about 6 weeks (around 7/19/2024) for recheck Hepatitis C after starting treatment.    Erna Coelho PA-C  [facility]  6/7/2024  13:10 EDT    Dictated Utilizing Dragon Dictation: Part of this note may be an electronic transcription/translation of spoken language to printed text using the Dragon Dictation System.

## 2024-06-07 NOTE — PROGRESS NOTES
New Patient Consult      Date: 2024   Patient Name: Rafael Todd  MRN: [id number]  : 1985     Primary Care Provider: Keiry Barrera APRN  Referring Provider: Bruce    Chief Complaint   Patient presents with    Hepatitis C     Pt here for initial Hep C eval     History of Present Illness: Rafael Todd is a 39 y.o. male who is here today as a consultation with Gastroenterology for evaluation of Hepatitis C.    He found out about having Hepatitis C infection approx 8 years ago. He has not had prior treatment for hepatitis. Reports no known personal history of liver disease including other viral hepatitis. There is no known family history of liver disease or cirrhosis. He admits to previous IVDU and intranasal drug use. Has been clean now for 10 years. He does have nonprofessional tattoos. Admits to having previous alcoholism. He drank daily and heavy (1 case of beer per day) for most of his life (at least 20-25 years). Now he is drinking at times, about 6 beers per month. He denies  current illicit drug use including marijuana. No recent liver imaging. He has not had recent labs. He has had previous vaccinations for Hepatitis A and B (he thinks). He is in between moving, was living in [place]. He is not currently working.    He has not had previous colonoscopy. He has had severe constipation in the past, would have large hard stools. Has been taking Linzess 290 mcg once daily. Now having a BM 2 times per day. No skip days between stools. Does take Suboxone as prescribed for many years. There is no first degree known family history of colon cancer or colon polyps. Maternal uncles (2) had colon cancer.     Takes omeprazole 40 mg once daily for heartburn which controls his symptoms. No dysphagia. No prior EGD.     Subjective      Past Medical History:   Diagnosis Date    Acid reflux     Anxiety     Back pain     Bipolar disorder     Depression     Drug-induced seizure      first, last  10/2012    Hepatitis C     Hepatitis C     History of being tatooed     Hypertension     Insomnia     Kidney stone 2012    Seasonal allergies     Seizure disorder     LAST SEIZURE FALL OF 2012    Tattoos     5     Past Surgical History:   Procedure Laterality Date    WISDOM TOOTH EXTRACTION       Family History   Problem Relation Age of Onset    Arthritis Mother     Mental illness Mother     Inflammatory bowel disease Mother     Arthritis Father     Diabetes Father     Hypertension Father     Mental illness Father     Colon cancer Maternal Uncle     Cancer Neg Hx      Social History     Socioeconomic History    Marital status:    Tobacco Use    Smoking status: Every Day     Current packs/day: 1.00     Average packs/day: 1 pack/day for 21.0 years (21.0 ttl pk-yrs)     Types: Cigarettes    Smokeless tobacco: Current     Types: Chew   Vaping Use    Vaping status: Some Days    Substances: Nicotine, Flavoring    Devices: Disposable   Substance and Sexual Activity    Alcohol use: Yes     Alcohol/week: 6.0 standard drinks of alcohol     Types: 6 Cans of beer per week     Comment: socially    Drug use: Yes     Frequency: 3.0 times per week     Types: Cocaine(coke)     Comment: previous IV-quit 2018    Sexual activity: Defer     Current Outpatient Medications:     albuterol sulfate HFA (VENTOLIN HFA) 108 (90 Base) MCG/ACT inhaler, Inhale 2 puffs Every 6 (Six) Hours As Needed for Wheezing or Shortness of Air., Disp: 3 inhaler, Rfl: 4    amitriptyline (ELAVIL) 50 MG tablet, Take 1 tablet by mouth Every Night., Disp: , Rfl:     buprenorphine-naloxone (SUBOXONE) 8-2 MG per SL tablet, Place 1 tablet under the tongue Daily., Disp: , Rfl: 0    Cariprazine HCl (Vraylar) 1.5 MG capsule capsule, Take 1 capsule by mouth Daily., Disp: , Rfl:     desvenlafaxine (PRISTIQ) 100 MG 24 hr tablet, Take 1 tablet by mouth Daily., Disp: , Rfl:     ibuprofen (ADVIL,MOTRIN) 800 MG tablet, Take 1 tablet by mouth Every 8 (Eight) Hours As  Needed for Mild Pain  or Moderate Pain ., Disp: 30 tablet, Rfl: 0    Linzess 290 MCG capsule capsule, Take 1 capsule by mouth Every Morning Before Breakfast., Disp: , Rfl:     montelukast (SINGULAIR) 10 MG tablet, Take 1 tablet by mouth Daily., Disp: , Rfl:     Multiple Vitamins-Minerals (MULTI ADULT GUMMIES PO), , Disp: , Rfl:     omeprazole (priLOSEC) 40 MG capsule, TAKE 1 CAPSULE BY MOUTH DAILY, Disp: 30 capsule, Rfl: 0    Allergies   Allergen Reactions    Gabapentin Other (See Comments)     SEIZURES      Keflex [Cephalexin] Nausea Only     The following portions of the patient's history were reviewed and updated as appropriate: allergies, current medications, past family history, past medical history, past social history, past surgical history and problem list.    Objective     Physical Exam  Vitals reviewed.   Constitutional:       General: He is not in acute distress.     Appearance: Normal appearance. He is well-developed. He is not ill-appearing or diaphoretic.   HENT:      Head: Normocephalic and atraumatic.      Right Ear: External ear normal.      Left Ear: External ear normal.      Nose: Nose normal.   Eyes:      General: No scleral icterus.        Right eye: No discharge.         Left eye: No discharge.      Conjunctiva/sclera: Conjunctivae normal.   Neck:      Vascular: No JVD.   Cardiovascular:      Rate and Rhythm: Normal rate and regular rhythm.      Heart sounds: Normal heart sounds. No murmur heard.     No friction rub. No gallop.   Pulmonary:      Effort: Pulmonary effort is normal. No respiratory distress.      Breath sounds: Normal breath sounds. No wheezing or rales.   Chest:      Chest wall: No tenderness.   Abdominal:      General: Bowel sounds are normal. There is no distension.      Palpations: Abdomen is soft. There is no mass.      Tenderness: There is no abdominal tenderness. There is no guarding.   Musculoskeletal:         General: No deformity. Normal range of motion.      Cervical  back: Normal range of motion.   Skin:     General: Skin is warm and dry.      Findings: No erythema or rash.      Comments: tattoos   Neurological:      Mental Status: He is alert and oriented to person, place, and time.      Coordination: Coordination normal.   Psychiatric:         Mood and Affect: Mood normal.         Behavior: Behavior normal.         Thought Content: Thought content normal.         Judgment: Judgment normal.       Vitals:    06/07/24 1135   BP: 144/95   Pulse: 103   Temp: 98.7 °F (37.1 °C)   SpO2: 97%   Weight: 92.4 kg (203 lb 12.8 oz)     Results Review:   I have reviewed the patient's new clinical and imaging results.    Admission on 10/30/2023, Discharged on 10/30/2023   Component Date Value Ref Range Status    Glucose 10/30/2023 115 (H)  65 - 99 mg/dL Final    BUN 10/30/2023 5 (L)  6 - 20 mg/dL Final    Creatinine 10/30/2023 0.87  0.76 - 1.27 mg/dL Final    Sodium 10/30/2023 144  136 - 145 mmol/L Final    Potassium 10/30/2023 3.4 (L)  3.5 - 5.2 mmol/L Final    Chloride 10/30/2023 107  98 - 107 mmol/L Final    CO2 10/30/2023 25.9  22.0 - 29.0 mmol/L Final    Calcium 10/30/2023 9.8  8.6 - 10.5 mg/dL Final    Total Protein 10/30/2023 8.9 (H)  6.0 - 8.5 g/dL Final    Albumin 10/30/2023 4.8  3.5 - 5.2 g/dL Final    ALT (SGPT) 10/30/2023 56 (H)  1 - 41 U/L Final    AST (SGOT) 10/30/2023 55 (H)  1 - 40 U/L Final    Alkaline Phosphatase 10/30/2023 102  39 - 117 U/L Final    Total Bilirubin 10/30/2023 0.5  0.0 - 1.2 mg/dL Final    Globulin 10/30/2023 4.1  gm/dL Final    A/G Ratio 10/30/2023 1.2  g/dL Final    BUN/Creatinine Ratio 10/30/2023 5.7 (L)  7.0 - 25.0 Final    Anion Gap 10/30/2023 11.1  5.0 - 15.0 mmol/L Final    eGFR 10/30/2023 111.9  >60.0 mL/min/1.73 Final    Acetaminophen 10/30/2023 <5.0  0.0 - 30.0 mcg/mL Final    Ethanol 10/30/2023 200 (H)  0 - 10 mg/dL Final    Ethanol % 10/30/2023 0.200  % Final    Salicylate 10/30/2023 <0.3  <=30.0 mg/dL Final    WBC 10/30/2023 9.14  3.40 - 10.80  10*3/mm3 Final    RBC 10/30/2023 5.74  4.14 - 5.80 10*6/mm3 Final    Hemoglobin 10/30/2023 17.3  13.0 - 17.7 g/dL Final    Hematocrit 10/30/2023 49.9  37.5 - 51.0 % Final    MCV 10/30/2023 86.9  79.0 - 97.0 fL Final    MCH 10/30/2023 30.1  26.6 - 33.0 pg Final    MCHC 10/30/2023 34.7  31.5 - 35.7 g/dL Final    RDW 10/30/2023 13.4  12.3 - 15.4 % Final    RDW-SD 10/30/2023 42.2  37.0 - 54.0 fl Final    MPV 10/30/2023 10.3  6.0 - 12.0 fL Final    Platelets 10/30/2023 227  140 - 450 10*3/mm3 Final    Fentanyl, Urine 10/30/2023 Negative  Negative Final    Ethanol 10/30/2023 121 (H)  0 - 10 mg/dL Final    Ethanol % 10/30/2023 0.121  % Final      CTAP stone protocol 5/2020  FINDINGS:  ABDOMEN: The lung bases are clear.  The heart size is normal.  Limited images of the liver are unremarkable.  The spleen is  normal.  No adrenal mass is identified.  The aorta is normal in  caliber.  There is no significant free fluid or adenopathy.  There is no nephrolithiasis.  There is no hydronephrosis.  The  gallbladder is present.  PELVIS: The appendix is not identified.  The urinary bladder is unremarkable.  There is no significant  free fluid or adenopathy.  There is gas and stool throughout the  colon.  IMPRESSION:  No hydronephrosis or nephrolithiasis.     Assessment / Plan      1. Chronic hepatitis C without hepatic coma  2. Elevated liver enzymes  3. History of alcoholism  4. History of illicit drug use  He has chronic Hepatitis C infection, genotype unknown, treatment naive, without suspected cirrhosis. Liver enzymes have been elevated in the past. He also previously drank 1 case of beer per day for about 20 years. Over the past several years, he has been drinking only occasionally, about 6 beers per month. No current illicit drug use, has been clean for 10 years, now taking Suboxone.     More recommendations will be made after these results have been reviewed. He will continue to abstain from alcohol and illegal drugs. Immunity  to Hep A and B will be determined and vaccinations recommended if needed. After review of these results and discussion with with the patient, we will proceed with Hep C therapy and will submit Rx to insurance company for approval. Treatment will depend on fibrosis score and Hep C genotype. When approved, he will  Rx from our pharmacy and start taking exactly as directed. Hep C viral load lab (HCV RNA quant) and CMP will be checked 4 weeks after start of therapy, at end of therapy and 3 months s/p therapy to determine response to treatment and cure. He will call with concerns.     - CBC (No Diff); Future  - Comprehensive Metabolic Panel; Future  - HCV FibroSURE; Future  - HCV RNA By PCR, Qn Rfx Mone; Future  - Hepatitis A Antibody, Total; Future  - Hepatitis B Core Antibody, Total; Future  - Hepatitis B Surface Antibody; Future  - Hepatitis B Surface Antigen; Future  - HIV-1 / O / 2 Ag / Antibody; Future  - Protime-INR; Future    5. Therapeutic opioid-induced constipation (OIC)  Was having very severe constipation with several skip days between bowel movements. His PCP prescribed Linzess 290 mcg once daily which he has been taking with dramatic improvements. Never had a colonoscopy. TSH in the past normal, last in 2020. No recent abdominal imaging.     Consider serum celiac testing in the future  Consider colonoscopy soon if symptoms persist  Continue Linzess 290 mcg once daily    6. Gastroesophageal reflux disease, unspecified whether esophagitis present  Has long history of reflux symptoms. Has been on PPI daily with great improvements. No dysphagia. No prior EGD.     Acid reflux measures  Continue PPI for now  Consider EGD in the future    Follow Up:   Return in about 6 weeks (around 7/19/2024) for recheck Hepatitis C after starting treatment.    Erna Coelho PA-C  [facility]  6/7/2024  13:10 EDT    Dictated Utilizing Dragon Dictation: Part of this note may be an electronic  transcription/translation of spoken language to printed text using the Dragon Dictation System.

## 2024-06-08 LAB
HAV AB SER QL IA: NEGATIVE
HBV CORE AB SERPL QL IA: POSITIVE

## 2024-06-11 LAB
A2 MACROGLOB SERPL-MCNC: 412 MG/DL (ref 110–276)
ALT SERPL W P-5'-P-CCNC: 47 IU/L (ref 0–55)
APO A-I SERPL-MCNC: 97 MG/DL (ref 101–178)
BILIRUB SERPL-MCNC: 0.3 MG/DL (ref 0–1.2)
FIBROSIS SCORING:: ABNORMAL
FIBROSIS STAGE SERPL QL: ABNORMAL
GGT SERPL-CCNC: 80 IU/L (ref 0–65)
HAPTOGLOB SERPL-MCNC: 145 MG/DL (ref 23–355)
HCV AB SER QL: ABNORMAL
LABORATORY COMMENT REPORT: ABNORMAL
LIVER FIBR SCORE SERPL CALC.FIBROSURE: 0.62 (ref 0–0.21)
NECROINFLAMM ACTIVITY SCORING:: ABNORMAL
NECROINFLAMMATORY ACT GRADE SERPL QL: ABNORMAL
NECROINFLAMMATORY ACT SCORE SERPL: 0.37 (ref 0–0.17)
SERVICE CMNT-IMP: ABNORMAL
TEST PERFORMANCE INFO SPEC: ABNORMAL

## 2024-06-12 LAB
HCV GENTYP SERPL NAA+PROBE: NORMAL
HCV GENTYP SERPL NAA+PROBE: NORMAL
HCV RNA SERPL NAA+PROBE-ACNC: NORMAL IU/ML
HCV RNA SERPL NAA+PROBE-ACNC: NORMAL IU/ML
HCV RNA SERPL NAA+PROBE-LOG IU: 7.14 LOG10 IU/ML
LABORATORY COMMENT REPORT: NORMAL

## 2024-06-13 ENCOUNTER — TELEPHONE (OUTPATIENT)
Dept: GASTROENTEROLOGY | Facility: CLINIC | Age: 39
End: 2024-06-13
Payer: MEDICAID

## 2024-06-13 DIAGNOSIS — B18.2 CHRONIC HEPATITIS C WITHOUT HEPATIC COMA: Primary | ICD-10-CM

## 2024-06-13 DIAGNOSIS — K74.00 LIVER FIBROSIS: ICD-10-CM

## 2024-06-13 DIAGNOSIS — R74.8 ELEVATED LIVER ENZYMES: ICD-10-CM

## 2024-06-13 RX ORDER — GLECAPREVIR AND PIBRENTASVIR 40; 100 MG/1; MG/1
3 TABLET, FILM COATED ORAL DAILY
Start: 2024-06-13 | End: 2024-06-18 | Stop reason: SDUPTHER

## 2024-06-13 NOTE — TELEPHONE ENCOUNTER
He has chronic hepatitis C infection, genotype Ia, treatment naïve, without cirrhosis (F3). I have prescribed Mavyret 3 tabs p.o. once daily for 8 weeks for hepatitis C treatment.     He needs hepatitis A vaccination series, immune hepatitis B  Also let him know I ordered ultrasound of the abdomen due to mild fibrosis stage on labs

## 2024-06-17 ENCOUNTER — SPECIALTY PHARMACY (OUTPATIENT)
Dept: PHARMACY | Facility: HOSPITAL | Age: 39
End: 2024-06-17
Payer: MEDICAID

## 2024-06-17 NOTE — PROGRESS NOTES
Pt phone number is disconnected. LVM for Vickie Todd to call the clinic back. She is listed on his verbal release.

## 2024-06-18 ENCOUNTER — SPECIALTY PHARMACY (OUTPATIENT)
Dept: PHARMACY | Facility: HOSPITAL | Age: 39
End: 2024-06-18
Payer: MEDICAID

## 2024-06-18 DIAGNOSIS — B18.2 CHRONIC HEPATITIS C WITHOUT HEPATIC COMA: ICD-10-CM

## 2024-06-18 RX ORDER — GLECAPREVIR AND PIBRENTASVIR 40; 100 MG/1; MG/1
3 TABLET, FILM COATED ORAL DAILY
Qty: 84 TABLET | Refills: 1 | Status: SHIPPED | OUTPATIENT
Start: 2024-06-18

## 2024-06-18 NOTE — PROGRESS NOTES
[facility]  Hepatitis C Initial Assessment     Rafael Todd is a 39 y.o. male diagnosed with Hepatitis C and [facility] for treatment. An initial outreach was conducted, including assessment of therapy appropriateness and specialty medication education for Mavyret.    Previous Hep C Treatment: Patient is treatment naïve    Relevant Past Medical History and Comorbidities  Past Medical History:   Diagnosis Date    Acid reflux     Anxiety     Back pain     Bipolar disorder     Depression 1999    Drug-induced seizure     2006 first, last 10/2012    Hepatitis C     Hepatitis C     History of being tatooed     Hypertension     Insomnia     Kidney stone 2012    Seasonal allergies     Seizure disorder     LAST SEIZURE FALL OF 2012    Tattoos     7     Social History     Socioeconomic History    Marital status:    Tobacco Use    Smoking status: Every Day     Current packs/day: 1.00     Average packs/day: 1 pack/day for 21.0 years (21.0 ttl pk-yrs)     Types: Cigarettes    Smokeless tobacco: Current     Types: Chew   Vaping Use    Vaping status: Some Days    Substances: Nicotine, Flavoring    Devices: Disposable   Substance and Sexual Activity    Alcohol use: Yes     Alcohol/week: 6.0 standard drinks of alcohol     Types: 6 Cans of beer per week     Comment: socially    Drug use: Yes     Frequency: 3.0 times per week     Types: Cocaine(coke)     Comment: previous IV-quit 2018    Sexual activity: Defer       Allergies  Gabapentin and Keflex [cephalexin]    Insurance Coverage & Financial Support: [facility]    Current Medication List:    Current Outpatient Medications:     Glecaprevir-Pibrentasvir (Mavyret) 100-40 MG tablet, Take 3 tablets by mouth Daily. Take all 3 tablets PO once daily with food, Disp: 84 tablet, Rfl: 1    albuterol sulfate HFA (VENTOLIN HFA) 108 (90 Base) MCG/ACT inhaler, Inhale 2 puffs Every 6 (Six) Hours As Needed for Wheezing or Shortness of Air., Disp: 3  inhaler, Rfl: 4    amitriptyline (ELAVIL) 50 MG tablet, Take 1 tablet by mouth Every Night., Disp: , Rfl:     buprenorphine-naloxone (SUBOXONE) 8-2 MG per SL tablet, Place 1 tablet under the tongue Daily., Disp: , Rfl: 0    Cariprazine HCl (Vraylar) 1.5 MG capsule capsule, Take 1 capsule by mouth Daily., Disp: , Rfl:     desvenlafaxine (PRISTIQ) 100 MG 24 hr tablet, Take 1 tablet by mouth Daily., Disp: , Rfl:     ibuprofen (ADVIL,MOTRIN) 800 MG tablet, Take 1 tablet by mouth Every 8 (Eight) Hours As Needed for Mild Pain  or Moderate Pain ., Disp: 30 tablet, Rfl: 0    Linzess 290 MCG capsule capsule, Take 1 capsule by mouth Every Morning Before Breakfast., Disp: , Rfl:     montelukast (SINGULAIR) 10 MG tablet, Take 1 tablet by mouth Daily., Disp: , Rfl:     Multiple Vitamins-Minerals (MULTI ADULT GUMMIES PO), , Disp: , Rfl:     omeprazole (priLOSEC) 40 MG capsule, TAKE 1 CAPSULE BY MOUTH DAILY, Disp: 30 capsule, Rfl: 0    Drug Interactions:  Medication list was reviewed for drug-drug interactions with Hepatitis C treatment. Noted potential interaction with PPI, recommended  administration times (PPI in AM and Mavyret in PM).    Relevant Laboratory Values:  Lab Results   Component Value Date    GLUCOSE 58 (L) 06/07/2024    CALCIUM 10.2 06/07/2024     06/07/2024    K 3.9 06/07/2024    CO2 24.8 06/07/2024     06/07/2024    BUN 17 06/07/2024    CREATININE 0.81 06/07/2024    EGFRIFAFRI 104 09/12/2017    EGFRIFNONA 97 12/28/2020    BCR 21.0 06/07/2024    ANIONGAP 11.2 06/07/2024    AST 43 (H) 06/07/2024    ALT 39 06/07/2024     Lab Results   Component Value Date    WBC 7.41 06/07/2024    HGB 16.1 06/07/2024    HCT 47.1 06/07/2024    MCV 89.0 06/07/2024     06/07/2024       HCV RNA quant:[id number]  Hepatitis C Genotype   Date Value Ref Range Status   06/07/2024 1a  Final     HCV Genotype   Date Value Ref Range Status   06/07/2024 Comment  Final     Comment:     To be performed on this specimen.        Fibrosis: F3    FIB-4: 0.62    Immunizations:  Hepatitis A: needs vaccine  Hepatitis B: immune  Lab Results   Component Value Date    HAV Negative 06/07/2024    HEPAIGM Non-Reactive 09/05/2019    HEPBCAB Positive (A) 06/07/2024         Co-infection Evaluation:  HIV -- Negative  Hepatitis B -- negative      Initial Education Provided for Mavyret  The patient has been provided with the following education for MAVYRET (glecaprevir and pibrentasvir). All questions and concerns have been addressed prior to the patient receiving the medication, and the patient has verbalized understanding of the education and any materials provided. Additional patient education shall be provided and documented upon request by the patient, provider or payer.      The following counseling points for Mavyret (glecaprevir and pibrentasvir) were addressed:  Goal of treatment:  This medication is used to treat hepatitis C infection with the goal of curing infection.  Directions for use:  Take 3 tablets by mouth once daily for a total of 8 weeks. Take tablets at the same time each day, with food.  Missed doses:  It is very important that you do not miss a dose of this medication. Use a pill planner, medication adherence gala or other tool to help you remember to take your medication.  If you do miss a dose and it is within 18 hours from the usual dosage time, administer dose as soon as possible, then administer next dose at the usual dosage time. If more than 18 hours from the usual dosage time has passed, skip the missed dose and administer the next dose at the usual time.  Do not stop taking this medication without talking to your provider.  Adverse effects:  Frequently reported side effects of this medication include: headache, loss of energy, nausea and diarrhea.   Go to the ED or call 911 with signs of a significant reaction including wheezing; chest tightness; fever; itching; bad cough; blue skin color; seizures; or swelling of face,  lips, tongue or throat.   Hepatic decompensation and hepatic failure have been reported. This typically occurs within the first 4 weeks of treatment initiation. Talk to your doctor right away if you notice dark urine, fatigue, lack of appetite, nausea, abdominal pain, light-colored stools, vomiting or yellow skin.  Follow-Up:  Be sure to keep your follow up appointment with our clinic 4 weeks after starting this medication to ensure you are tolerating it well and that you are responding appropriately to therapy.   Make sure to tell your doctor and pharmacist about all medications you are taking, including herbal supplements and OTC products at each visit. Contact clinic if any new medications are started during treatment.  Storage:  Store this medication at room temperature, away from any extreme temperatures or moisture exposure.    Patient Specific Counseling Points:   If childbearing age: Use of contraception during treatment in females of childbearing potential is recommended. Consider postponing pregnancy until therapy is complete to reduce the risk of HCV transmission. This medication should not be used in pregnant females and it is not known if the medication is present in breast milk.   If diabetic: Patients with diabetes should closely monitor their blood sugar after starting this medication as it may lead to an improvement in glucose metabolism, potentially resulting in hypoglycemia. Monitor for signs and symptoms of hypoglycemia and follow the rule of 15 to treat hypoglycemia.      Adherence and Self-Administration  Barriers to Patient Adherence and/or Self-Administration: None  Methods for Supporting Patient Adherence and/or Self-Administration: None Required     Associated Vaccinations   Your chronic liver disease puts you at risk for serious complications if you get infected with hepatitis A virus. If you've never been vaccinated against hepatitis A, you need 2 doses of this vaccine, usually spaced  6-19 months apart.     If you already have chronic hepatitis B infection, you won't need a hepatitis B vaccine.  However, if you do not have sufficient Hepatitis B antibodies (either not vaccinated or insufficient response to vaccination), you should get the Hepatitis B vaccination series.  The vaccine is given in 2 or 3 doses, depending on the brand.     A combination A & B vaccination is also available if both are needed.     Contraindications: Severe allergic reaction (eg, anaphylaxis) after a previous dose of any hepatitis A-containing or hepatitis B-containing vaccine or any component of the formulation, including yeast and neomycin.   Precautions: Consider deferring administration in patients with moderate or severe acute illness. Use with caution in patients with bleeding disorders or severely immunocompromised patients    Rafael Todd was counseled that the following immunizations are recommended: hepatitis A    Goals of Therapy:  Patient Goals of Therapy: Adherence  Clinical Goals or Therapeutic Targets, If Applicable: SVR 12 weeks    Attestation:  I attest that the initiated specialty medication is appropriate for the patient based on my assessment.  If the prescribed therapy is at any point deemed not appropriate based on the current or future assessments, a consultation will be initiated with the patient's specialty care provider to determine the best course of action. The revised plan of therapy will be documented along with any additional patient education provided.     Assessment & Plan:  Patient has Hepatitis C, genotype 1a (F3)(calculated FIB-4 = 0.62) and is treatment naïve. Patient has been prescribed Mavyret 3 tablets daily with food x 8 weeks. Medication education and counseling provided as above.  Patient will obtain medication from [facility] [place] retail pharmacy.  The following immunizations are needed: hepatitis A.   Patient will follow up in clinic 4 weeks after starting medication to assess virologic  response and medication tolerability.     Cosme Springer RPH  6/18/2024  14:55 EDT

## 2024-07-16 ENCOUNTER — HOSPITAL ENCOUNTER (EMERGENCY)
Facility: HOSPITAL | Age: 39
Discharge: HOME | End: 2024-07-16
Attending: STUDENT IN AN ORGANIZED HEALTH CARE EDUCATION/TRAINING PROGRAM
Payer: MEDICAID

## 2024-07-16 ENCOUNTER — APPOINTMENT (OUTPATIENT)
Dept: CT IMAGING | Facility: HOSPITAL | Age: 39
End: 2024-07-16
Payer: MEDICAID

## 2024-07-16 VITALS
TEMPERATURE: 98 F | HEART RATE: 69 BPM | HEIGHT: 72 IN | DIASTOLIC BLOOD PRESSURE: 86 MMHG | OXYGEN SATURATION: 98 % | RESPIRATION RATE: 16 BRPM | SYSTOLIC BLOOD PRESSURE: 129 MMHG | WEIGHT: 190 LBS | BODY MASS INDEX: 25.73 KG/M2

## 2024-07-16 DIAGNOSIS — N20.1 URETEROLITHIASIS: Primary | ICD-10-CM

## 2024-07-16 DIAGNOSIS — N20.0 KIDNEY STONE ON LEFT SIDE: ICD-10-CM

## 2024-07-16 LAB
ALBUMIN SERPL-MCNC: 4.5 G/DL (ref 3.5–5.2)
ALBUMIN/GLOB SERPL: 1 G/DL
ALP SERPL-CCNC: 123 U/L (ref 39–117)
ALT SERPL W P-5'-P-CCNC: 14 U/L (ref 1–41)
ANION GAP SERPL CALCULATED.3IONS-SCNC: 15 MMOL/L (ref 5–15)
AST SERPL-CCNC: 22 U/L (ref 1–40)
BACTERIA UR QL AUTO: ABNORMAL /HPF
BASOPHILS # BLD AUTO: 0.05 10*3/MM3 (ref 0–0.2)
BASOPHILS NFR BLD AUTO: 0.3 % (ref 0–1.5)
BILIRUB SERPL-MCNC: 1 MG/DL (ref 0–1.2)
BILIRUB UR QL STRIP: ABNORMAL
BUN SERPL-MCNC: 16 MG/DL (ref 6–20)
BUN/CREAT SERPL: 13.7 (ref 7–25)
CALCIUM SPEC-SCNC: 10.2 MG/DL (ref 8.6–10.5)
CHLORIDE SERPL-SCNC: 101 MMOL/L (ref 98–107)
CLARITY UR: ABNORMAL
CO2 SERPL-SCNC: 22 MMOL/L (ref 22–29)
COD CRY URNS QL: ABNORMAL /HPF
COLOR UR: ABNORMAL
CREAT SERPL-MCNC: 1.17 MG/DL (ref 0.76–1.27)
DEPRECATED RDW RBC AUTO: 40.9 FL (ref 37–54)
EGFRCR SERPLBLD CKD-EPI 2021: 80.3 ML/MIN/1.73
EOSINOPHIL # BLD AUTO: 0.12 10*3/MM3 (ref 0–0.4)
EOSINOPHIL NFR BLD AUTO: 0.7 % (ref 0.3–6.2)
ERYTHROCYTE [DISTWIDTH] IN BLOOD BY AUTOMATED COUNT: 12.8 % (ref 12.3–15.4)
GLOBULIN UR ELPH-MCNC: 4.6 GM/DL
GLUCOSE SERPL-MCNC: 95 MG/DL (ref 65–99)
GLUCOSE UR STRIP-MCNC: NEGATIVE MG/DL
HCT VFR BLD AUTO: 47.8 % (ref 37.5–51)
HGB BLD-MCNC: 16.9 G/DL (ref 13–17.7)
HGB UR QL STRIP.AUTO: ABNORMAL
HYALINE CASTS UR QL AUTO: ABNORMAL /LPF
IMM GRANULOCYTES # BLD AUTO: 0.07 10*3/MM3 (ref 0–0.05)
IMM GRANULOCYTES NFR BLD AUTO: 0.4 % (ref 0–0.5)
KETONES UR QL STRIP: ABNORMAL
LEUKOCYTE ESTERASE UR QL STRIP.AUTO: ABNORMAL
LIPASE SERPL-CCNC: 15 U/L (ref 13–60)
LYMPHOCYTES # BLD AUTO: 3.48 10*3/MM3 (ref 0.7–3.1)
LYMPHOCYTES NFR BLD AUTO: 21.3 % (ref 19.6–45.3)
MCH RBC QN AUTO: 31 PG (ref 26.6–33)
MCHC RBC AUTO-ENTMCNC: 35.4 G/DL (ref 31.5–35.7)
MCV RBC AUTO: 87.5 FL (ref 79–97)
MONOCYTES # BLD AUTO: 1.41 10*3/MM3 (ref 0.1–0.9)
MONOCYTES NFR BLD AUTO: 8.6 % (ref 5–12)
NEUTROPHILS NFR BLD AUTO: 11.2 10*3/MM3 (ref 1.7–7)
NEUTROPHILS NFR BLD AUTO: 68.7 % (ref 42.7–76)
NITRITE UR QL STRIP: NEGATIVE
NRBC BLD AUTO-RTO: 0 /100 WBC (ref 0–0.2)
PH UR STRIP.AUTO: 5.5 [PH] (ref 5–8)
PLATELET # BLD AUTO: 262 10*3/MM3 (ref 140–450)
PMV BLD AUTO: 11 FL (ref 6–12)
POTASSIUM SERPL-SCNC: 4.2 MMOL/L (ref 3.5–5.2)
PROT SERPL-MCNC: 9.1 G/DL (ref 6–8.5)
PROT UR QL STRIP: ABNORMAL
RBC # BLD AUTO: 5.46 10*6/MM3 (ref 4.14–5.8)
RBC # UR STRIP: ABNORMAL /HPF
REF LAB TEST METHOD: ABNORMAL
SODIUM SERPL-SCNC: 138 MMOL/L (ref 136–145)
SP GR UR STRIP: 1.03 (ref 1–1.03)
SQUAMOUS #/AREA URNS HPF: ABNORMAL /HPF
UROBILINOGEN UR QL STRIP: ABNORMAL
WBC # UR STRIP: ABNORMAL /HPF
WBC NRBC COR # BLD AUTO: 16.33 10*3/MM3 (ref 3.4–10.8)
YEAST URNS QL MICRO: ABNORMAL /HPF

## 2024-07-16 PROCEDURE — 74176 CT ABD & PELVIS W/O CONTRAST: CPT

## 2024-07-16 PROCEDURE — 96375 TX/PRO/DX INJ NEW DRUG ADDON: CPT

## 2024-07-16 PROCEDURE — 85025 COMPLETE CBC W/AUTO DIFF WBC: CPT | Performed by: STUDENT IN AN ORGANIZED HEALTH CARE EDUCATION/TRAINING PROGRAM

## 2024-07-16 PROCEDURE — 25810000003 SODIUM CHLORIDE 0.9 % SOLUTION: Performed by: STUDENT IN AN ORGANIZED HEALTH CARE EDUCATION/TRAINING PROGRAM

## 2024-07-16 PROCEDURE — 25010000002 ONDANSETRON PER 1 MG: Performed by: STUDENT IN AN ORGANIZED HEALTH CARE EDUCATION/TRAINING PROGRAM

## 2024-07-16 PROCEDURE — 96365 THER/PROPH/DIAG IV INF INIT: CPT

## 2024-07-16 PROCEDURE — 99284 EMERGENCY DEPT VISIT MOD MDM: CPT

## 2024-07-16 PROCEDURE — 81001 URINALYSIS AUTO W/SCOPE: CPT | Performed by: STUDENT IN AN ORGANIZED HEALTH CARE EDUCATION/TRAINING PROGRAM

## 2024-07-16 PROCEDURE — 25010000002 CEFTRIAXONE PER 250 MG: Performed by: STUDENT IN AN ORGANIZED HEALTH CARE EDUCATION/TRAINING PROGRAM

## 2024-07-16 PROCEDURE — 25010000002 KETOROLAC TROMETHAMINE PER 15 MG: Performed by: STUDENT IN AN ORGANIZED HEALTH CARE EDUCATION/TRAINING PROGRAM

## 2024-07-16 PROCEDURE — 80053 COMPREHEN METABOLIC PANEL: CPT | Performed by: STUDENT IN AN ORGANIZED HEALTH CARE EDUCATION/TRAINING PROGRAM

## 2024-07-16 PROCEDURE — 83690 ASSAY OF LIPASE: CPT | Performed by: STUDENT IN AN ORGANIZED HEALTH CARE EDUCATION/TRAINING PROGRAM

## 2024-07-16 RX ORDER — CEFDINIR 300 MG/1
300 CAPSULE ORAL 2 TIMES DAILY
Qty: 14 CAPSULE | Refills: 0 | Status: SHIPPED | OUTPATIENT
Start: 2024-07-16 | End: 2024-07-23

## 2024-07-16 RX ORDER — KETOROLAC TROMETHAMINE 30 MG/ML
15 INJECTION, SOLUTION INTRAMUSCULAR; INTRAVENOUS ONCE
Status: COMPLETED | OUTPATIENT
Start: 2024-07-16 | End: 2024-07-16

## 2024-07-16 RX ORDER — HYDROCODONE BITARTRATE AND ACETAMINOPHEN 5; 325 MG/1; MG/1
1 TABLET ORAL EVERY 6 HOURS PRN
Qty: 6 TABLET | Refills: 0 | Status: SHIPPED | OUTPATIENT
Start: 2024-07-16

## 2024-07-16 RX ORDER — ONDANSETRON 2 MG/ML
4 INJECTION INTRAMUSCULAR; INTRAVENOUS ONCE
Status: COMPLETED | OUTPATIENT
Start: 2024-07-16 | End: 2024-07-16

## 2024-07-16 RX ORDER — TAMSULOSIN HYDROCHLORIDE 0.4 MG/1
1 CAPSULE ORAL DAILY
Qty: 5 CAPSULE | Refills: 0 | Status: SHIPPED | OUTPATIENT
Start: 2024-07-16

## 2024-07-16 RX ORDER — SODIUM CHLORIDE 0.9 % (FLUSH) 0.9 %
10 SYRINGE (ML) INJECTION AS NEEDED
Status: DISCONTINUED | OUTPATIENT
Start: 2024-07-16 | End: 2024-07-16 | Stop reason: HOSPADM

## 2024-07-16 RX ADMIN — SODIUM CHLORIDE 1000 ML: 9 INJECTION, SOLUTION INTRAVENOUS at 02:29

## 2024-07-16 RX ADMIN — KETOROLAC TROMETHAMINE 15 MG: 30 INJECTION, SOLUTION INTRAMUSCULAR; INTRAVENOUS at 02:30

## 2024-07-16 RX ADMIN — CEFTRIAXONE 1000 MG: 1 INJECTION, POWDER, FOR SOLUTION INTRAMUSCULAR; INTRAVENOUS at 04:12

## 2024-07-16 RX ADMIN — ONDANSETRON 4 MG: 2 INJECTION INTRAMUSCULAR; INTRAVENOUS at 02:29

## 2024-07-16 NOTE — ED PROVIDER NOTES
EMERGENCY DEPARTMENT ENCOUNTER    Pt Name: Rafael Todd  MRN: [id number]  Pt :   1985  Room Number:    Date of encounter:  2024  PCP: Keiry Barrera APRN  ED Provider: Waylon Amaya MD    Historian: Patient      HPI:  Chief Complaint: Flank pain        Context: Rafael Todd is a 39 y.o. male who presents to the ED c/o flank pain.  Patient states tonight he had sudden onset of left-sided flank pain.  Now feels like the pain is moved into his bladder.  Also reports burning with urination and dark urine.  States he has had kidney stone in the past with similar symptoms.  Took Tylenol along with a hydrocodone tablet at home without much improvement.  Has also had nausea.  No other symptoms reported at this time.      PAST MEDICAL HISTORY  Past Medical History:   Diagnosis Date    Acid reflux     Anxiety     Back pain     Bipolar disorder     Depression     Drug-induced seizure     2006 first, last 10/2012    Hepatitis C     Hepatitis C     History of being tatooed     Hypertension     Insomnia     Kidney stone     Seasonal allergies     Seizure disorder     LAST SEIZURE     Tattoos     5         PAST SURGICAL HISTORY  Past Surgical History:   Procedure Laterality Date    WISDOM TOOTH EXTRACTION           FAMILY HISTORY  Family History   Problem Relation Age of Onset    Arthritis Mother     Mental illness Mother     Inflammatory bowel disease Mother     Arthritis Father     Diabetes Father     Hypertension Father     Mental illness Father     Colon cancer Maternal Uncle     Cancer Neg Hx          SOCIAL HISTORY  Social History     Socioeconomic History    Marital status:    Tobacco Use    Smoking status: Every Day     Current packs/day: 1.00     Average packs/day: 1 pack/day for 21.0 years (21.0 ttl pk-yrs)     Types: Cigarettes    Smokeless tobacco: Current     Types: Chew   Vaping Use    Vaping status: Some Days    Substances: Nicotine, Flavoring    Devices:  Disposable   Substance and Sexual Activity    Alcohol use: Yes     Alcohol/week: 6.0 standard drinks of alcohol     Types: 6 Cans of beer per week     Comment: socially    Drug use: Yes     Frequency: 3.0 times per week     Types: Cocaine(coke)     Comment: previous IV-quit 2018    Sexual activity: Defer         ALLERGIES  Gabapentin and Keflex [cephalexin]        REVIEW OF SYSTEMS  Review of Systems     All systems reviewed and negative except for those discussed in HPI.       PHYSICAL EXAM    I have reviewed the triage vital signs and nursing notes.    ED Triage Vitals [07/16/24 0216]   Temp Heart Rate Resp BP SpO2   98 °F (36.7 °C) 82 20 (!) 141/107 100 %      Temp src Heart Rate Source Patient Position BP Location FiO2 (%)   Oral Monitor Sitting Right arm --       Physical Exam    General:  Awake, alert, no acute distress  HEENT: Atraumatic, normocephalic, EOMI, PERRLA, mucous membranes moist  NECK:  Supple, atraumatic  Cardiovascular:  Regular rate, regular rhythm, no murmurs, rubs, or gallops.  Extremities well perfused   Respiratory:  Regular rate, clear lungs to auscultation bilaterally.  No rhonchi, rales, wheezing  Abdominal:  Soft, nondistended, suprapubic tenderness.  No guarding or rebound.  No palpable masses  Extremity:  No visible bony abnormalities in all 4 extremities.  Full range of motion of all extremities.  Skin:  Warm and dry.  No rashes  Neuro:  AAOx3, GCS 15. Cranial nerves 2-12 grossly intact.  No focal strength or sensation deficits.  Psych:  Mood and affect appropriate.        LAB RESULTS  Recent Results (from the past 24 hour(s))   Comprehensive Metabolic Panel    Collection Time: 07/16/24  2:26 AM    Specimen: Blood   Result Value Ref Range    Glucose 95 65 - 99 mg/dL    BUN 16 6 - 20 mg/dL    Creatinine 1.17 0.76 - 1.27 mg/dL    Sodium 138 136 - 145 mmol/L    Potassium 4.2 3.5 - 5.2 mmol/L    Chloride 101 98 - 107 mmol/L    CO2 22.0 22.0 - 29.0 mmol/L    Calcium 10.2 8.6 - 10.5 mg/dL     Total Protein 9.1 (H) 6.0 - 8.5 g/dL    Albumin 4.5 3.5 - 5.2 g/dL    ALT (SGPT) 14 1 - 41 U/L    AST (SGOT) 22 1 - 40 U/L    Alkaline Phosphatase 123 (H) 39 - 117 U/L    Total Bilirubin 1.0 0.0 - 1.2 mg/dL    Globulin 4.6 gm/dL    A/G Ratio 1.0 g/dL    BUN/Creatinine Ratio 13.7 7.0 - 25.0    Anion Gap 15.0 5.0 - 15.0 mmol/L    eGFR 80.3 >60.0 mL/min/1.73   Lipase    Collection Time: 07/16/24  2:26 AM    Specimen: Blood   Result Value Ref Range    Lipase 15 13 - 60 U/L   CBC Auto Differential    Collection Time: 07/16/24  2:26 AM    Specimen: Blood   Result Value Ref Range    WBC 16.33 (H) 3.40 - 10.80 10*3/mm3    RBC 5.46 4.14 - 5.80 10*6/mm3    Hemoglobin 16.9 13.0 - 17.7 g/dL    Hematocrit 47.8 37.5 - 51.0 %    MCV 87.5 79.0 - 97.0 fL    MCH 31.0 26.6 - 33.0 pg    MCHC 35.4 31.5 - 35.7 g/dL    RDW 12.8 12.3 - 15.4 %    RDW-SD 40.9 37.0 - 54.0 fl    MPV 11.0 6.0 - 12.0 fL    Platelets 262 140 - 450 10*3/mm3    Neutrophil % 68.7 42.7 - 76.0 %    Lymphocyte % 21.3 19.6 - 45.3 %    Monocyte % 8.6 5.0 - 12.0 %    Eosinophil % 0.7 0.3 - 6.2 %    Basophil % 0.3 0.0 - 1.5 %    Immature Grans % 0.4 0.0 - 0.5 %    Neutrophils, Absolute 11.20 (H) 1.70 - 7.00 10*3/mm3    Lymphocytes, Absolute 3.48 (H) 0.70 - 3.10 10*3/mm3    Monocytes, Absolute 1.41 (H) 0.10 - 0.90 10*3/mm3    Eosinophils, Absolute 0.12 0.00 - 0.40 10*3/mm3    Basophils, Absolute 0.05 0.00 - 0.20 10*3/mm3    Immature Grans, Absolute 0.07 (H) 0.00 - 0.05 10*3/mm3    nRBC 0.0 0.0 - 0.2 /100 WBC   Urinalysis With Microscopic If Indicated (No Culture) - Urine, Clean Catch    Collection Time: 07/16/24  2:42 AM    Specimen: Urine, Clean Catch   Result Value Ref Range    Color, UA Dark Yellow (A) Yellow, Straw    Appearance, UA Cloudy (A) Clear    pH, UA 5.5 5.0 - 8.0    Specific Gravity, UA 1.027 1.005 - 1.030    Glucose, UA Negative Negative    Ketones, UA 15 mg/dL (1+) (A) Negative    Bilirubin, UA Small (1+) (A) Negative    Blood, UA Large (3+) (A) Negative     Protein, UA 30 mg/dL (1+) (A) Negative    Leuk Esterase, UA Moderate (2+) (A) Negative    Nitrite, UA Negative Negative    Urobilinogen, UA 1.0 E.U./dL 0.2 - 1.0 E.U./dL   Urinalysis, Microscopic Only - Urine, Clean Catch    Collection Time: 07/16/24  2:42 AM    Specimen: Urine, Clean Catch   Result Value Ref Range    RBC, UA 11-20 (A) None Seen, 0-2 /HPF    WBC, UA 11-20 (A) None Seen, 0-2 /HPF    Bacteria, UA Trace (A) None Seen /HPF    Squamous Epithelial Cells, UA 0-2 None Seen, 0-2 /HPF    Yeast, UA Moderate/2+ Budding Yeast w/Hyphae (A) None Seen /HPF    Hyaline Casts, UA None Seen None Seen /LPF    Calcium Oxalate Crystals, UA Small/1+ None Seen /HPF    Methodology Manual Light Microscopy        If labs were ordered, I independently reviewed the results and considered them in treating the patient.        RADIOLOGY  CT Abdomen Pelvis Without Contrast    Result Date: 7/16/2024  FINAL REPORT TECHNIQUE: null CLINICAL HISTORY: Left flank pain and bladder pain COMPARISON: null FINDINGS: CT abdomen and pelvis without contrast Comparison: None Findings: The lung bases are clear. Atherosclerosis of the abdominal aorta without aneurysm. Stomach is nondistended. The liver and gallbladder unremarkable. Pancreatic atrophy. The spleen and bilateral adrenal glands are normal. The right kidney is unremarkable. A duplicated left renal collecting system. Distal 2 mm left ureterolithiasis just proximal of the UVJ resulting in mild left hydroureteronephrosis and minimal left perinephric stranding. The urinary bladder is nondistended. Pelvic contents unremarkable. No bowel obstruction, pneumoperitoneum, or pneumatosis. Mild-to-moderate colonic fecal burden. Normal appendix. The bones are intact.     IMPRESSION: 1. A duplicated left renal collecting system. Distal 2 mm left ureterolithiasis just proximal of the UVJ resulting in mild left hydroureteronephrosis and minimal left perinephric stranding. 2. Mild-to-moderate colonic  fecal burden. 3. Normal appendix. Authenticated and Electronically Signed by Madhu Wong DO on 07/16/2024 03:48:59 AM     I ordered and independently reviewed the above noted radiographic studies.     See radiologist's dictation for official interpretation.        PROCEDURES    Procedures    No orders to display       MEDICATIONS GIVEN IN ER    Medications   sodium chloride 0.9 % flush 10 mL (has no administration in time range)   sodium chloride 0.9 % bolus 1,000 mL (0 mL Intravenous Stopped 7/16/24 0411)   ondansetron (ZOFRAN) injection 4 mg (4 mg Intravenous Given 7/16/24 0229)   ketorolac (TORADOL) injection 15 mg (15 mg Intravenous Given 7/16/24 0230)   cefTRIAXone (ROCEPHIN) 1,000 mg in sodium chloride 0.9 % 100 mL IVPB-VTB (0 mg Intravenous Stopped 7/16/24 0440)         MEDICAL DECISION MAKING, PROGRESS, and CONSULTS    All labs, if obtained, have been independently reviewed by me.  All radiology studies, if obtained, have been reviewed by me and the radiologist dictating the report.  All EKG's, if obtained, have been independently viewed and interpreted by me.      Discussion below represents my analysis of pertinent findings related to patient's condition, differential diagnosis, treatment plan and final disposition.    Rafael Todd is a 39 y.o. male who presents to the ED c/o pain.  Hemodynamically stable nontoxic in appearance upon arrival.  Differential includes was not limited to kidney stone, UTI, bladder obstruction, appendicitis.  Urinalysis ordered along with labs.  Patient given 1 L bolus of normal saline IV fluids.  Patient also given 4 mg of IV Zofran and 15 mg of IV Toradol for symptomatic treatment.    Urine shows increased RBC of 11-20 along with increased WBC of 11-20 with trace bacteria and yeast cells.  Patient empirically given IV Rocephin.    Labs show leukocytosis of 16.3.  Normal renal function panel.  Normal electrolyte panel.  Normal LFTs and normal lipase.    CT of abdomen pelvis  obtained which shows evidence of a distal 2 mm left ureterolithiasis proximal to the UVJ with mild left hydronephrosis and minimal left perinephric stranding.  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    Symptoms well-controlled in emergency department.  Patient given short course prescription for pain medication to help with passage of stone along with Flomax.  Also given referral to urology.  Patient agreeable with this plan was discharged home.                                 Orders placed during this visit:  Orders Placed This Encounter   Procedures    CT Abdomen Pelvis Without Contrast    Comprehensive Metabolic Panel    Urinalysis With Microscopic If Indicated (No Culture) - Urine, Clean Catch    Lipase    CBC Auto Differential    Urinalysis, Microscopic Only - Urine, Clean Catch    Insert Peripheral IV    CBC & Differential         ED Course:    Consultants:                  Shared Decision Making:  After my consideration of clinical presentation and any laboratory/radiology studies obtained, I discussed the findings with the patient/patient representative who is in agreement with the treatment plan and the final disposition.   Risks and benefits of discharge and/or observation/admission were discussed.      AS OF 04:43 EDT VITALS:    BP - 129/86  HR - 69  TEMP - 98 °F (36.7 °C) (Oral)  O2 SATS - 98%                  DIAGNOSIS  Final diagnoses:   Ureterolithiasis   Kidney stone on left side         DISPOSITION  Discharge      Please note that portions of this document were completed with voice recognition software.        Waylon Amaya MD  07/16/24 [id number]

## 2024-07-16 NOTE — DISCHARGE INSTRUCTIONS
Continue Tylenol and Motrin as needed for pain.  Continue Flomax daily to help with passage of stone.  Also recommend taking antibiotics twice daily to help prevent kidney infection.  If stone does not pass on its own within the next 2 to 3 days, follow-up with urology as an outpatient, contact number provided to arrange appointment.

## 2024-07-16 NOTE — ED NOTES
Reports right flank pain that radiates around to right lower abd into right testicle, reports difficulty urinating with burning. Started about 2100

## 2024-08-29 ENCOUNTER — SPECIALTY PHARMACY (OUTPATIENT)
Dept: PHARMACY | Facility: HOSPITAL | Age: 39
End: 2024-08-29
Payer: MEDICAID